# Patient Record
Sex: FEMALE | Race: BLACK OR AFRICAN AMERICAN | Employment: UNEMPLOYED | URBAN - METROPOLITAN AREA
[De-identification: names, ages, dates, MRNs, and addresses within clinical notes are randomized per-mention and may not be internally consistent; named-entity substitution may affect disease eponyms.]

---

## 2017-01-06 ENCOUNTER — OFFICE VISIT (OUTPATIENT)
Dept: FAMILY MEDICINE CLINIC | Facility: CLINIC | Age: 82
End: 2017-01-06

## 2017-01-06 VITALS
TEMPERATURE: 97.1 F | HEART RATE: 74 BPM | BODY MASS INDEX: 23.49 KG/M2 | HEIGHT: 65 IN | WEIGHT: 141 LBS | RESPIRATION RATE: 15 BRPM | SYSTOLIC BLOOD PRESSURE: 120 MMHG | OXYGEN SATURATION: 100 % | DIASTOLIC BLOOD PRESSURE: 64 MMHG

## 2017-01-06 DIAGNOSIS — H81.10 BPV (BENIGN POSITIONAL VERTIGO), UNSPECIFIED LATERALITY: ICD-10-CM

## 2017-01-06 DIAGNOSIS — N39.0 URINARY TRACT INFECTION, SITE UNSPECIFIED: Primary | ICD-10-CM

## 2017-01-06 DIAGNOSIS — S89.92XA KNEE INJURY, LEFT, INITIAL ENCOUNTER: ICD-10-CM

## 2017-01-06 DIAGNOSIS — R63.0 ANOREXIA: ICD-10-CM

## 2017-01-06 DIAGNOSIS — R26.89 BALANCE DISORDER: ICD-10-CM

## 2017-01-06 DIAGNOSIS — I63.9 CEREBELLAR STROKE (HCC): ICD-10-CM

## 2017-01-06 DIAGNOSIS — N39.0 URINARY TRACT INFECTION, SITE UNSPECIFIED: ICD-10-CM

## 2017-01-06 DIAGNOSIS — H91.92 HEARING PROBLEM, LEFT: ICD-10-CM

## 2017-01-06 RX ORDER — MECLIZINE HYDROCHLORIDE 25 MG/1
25 TABLET ORAL
Qty: 30 TAB | Refills: 1 | Status: SHIPPED | OUTPATIENT
Start: 2017-01-06 | End: 2017-01-16

## 2017-01-06 RX ORDER — NITROFURANTOIN 25; 75 MG/1; MG/1
100 CAPSULE ORAL 2 TIMES DAILY
Qty: 14 CAP | Refills: 0 | Status: SHIPPED | OUTPATIENT
Start: 2017-01-06 | End: 2017-02-03

## 2017-01-06 NOTE — PROGRESS NOTES
Saba Umaña is a 80 y.o.  female presents today for same day sick visit for balance problems. Pt is in Room # 2. This patient is accompanied in the office by her child and son. .        1. Have you been to the ER, urgent care clinic since your last visit? Hospitalized since your last visit? No    2. Have you seen or consulted any other health care providers outside of the [facility] since your last visit? Include any pap smears or colon screening.  No

## 2017-01-06 NOTE — PROGRESS NOTES
SUBJECTIVE    Chief Complaint   Patient presents with    Urinary Burning     HPI:   She comes in with her son. She says that she has burning on urination for the last several days. She has been getting dizzy with nausea now with even little movement of her head with occasional vomiting. Now her hearing in Lt ear is muffled. She feels dysbalance because she is not comfortable with her footing. There is no change. Her chronic back pain is unchanged. Now she has pain in her Lt knee. She says that she fell on that knee 2 days ago. Past Medical History   Diagnosis Date    Back pain     DJD (degenerative joint disease)      mostly of knees    DM (diabetes mellitus) (HCC)     Fatigue     GERD (gastroesophageal reflux disease)      w/chronic pulmonary fibrosis    Herpes zoster     Lumbar spondylosis      LBP    Motion sickness      chronic    PVCs (premature ventricular contractions)      recurrent    Varicose veins     Venous insufficiency      Lt LE     History reviewed. No pertinent past surgical history. Current Outpatient Prescriptions   Medication Sig    aspirin-acetaminophen-caffeine (EXCEDRIN ES) 250-250-65 mg per tablet Take 1 Tab by mouth.  Ca-D3-mag-zinc--kristina-boron (CALTRATE 600+D PLUS MINERALS) 600 mg calcium- 800 unit-40 mg chew Take 1 Tab by mouth two (2) times a day.  psyllium (FIBER) 0.52 gram capsule Take 1 Cap by mouth daily.  polyethylene glycol (MIRALAX) 17 gram/dose powder Take 17 g by mouth daily as needed.  ascorbic acid (VITAMIN C) 1,000 mg tablet Take 1,000 mg by mouth daily.  FERROUS FUMARATE/VIT BCOMP&C (SUPER B COMPLEX PO) Take 1 tablet by mouth daily.  fish oil-omega-3 fatty acids (FISH OIL) 300-1,000 mg cap Take 1 capsule by mouth daily.  coenzyme q10-vitamin e (COQ10 ) 100-100 mg-unit cap Take  by mouth. Two capsules in the morning and two capsules in the evening daily.     GLUCOSAMINE/CHONDROITN/NA/C/SE (JOINT FORMULA PO) Take by mouth. Two capsules in the morning and two capsules in the evening. No current facility-administered medications for this visit. Allergies: Iodine    ROS:   Constitutional: No fever, chills, night sweats, malaise. ENT: otherwise not complaints. Cardiovascular: No angina, palpitations, PND, orthopnea, lightheadedness, edema, claudication. Respiratory: No pleurisy, hemoptysis, unusual sputum. Gastrointestinal: no pain, melena, hematochezia. Psychiatric: No agitation, confusion/disorientation, suicidal or homicidal ideation. Musculoskeletal: chronic back problem and gait difficulty. Now Lt knee injury. OBJECTIVE  Constitutional: General Appearance:  well developed, well nourished, nontoxic, in no acute distress. Visit Vitals    /64 (BP 1 Location: Left arm, BP Patient Position: Sitting)    Pulse 74    Temp 97.1 °F (36.2 °C) (Oral)    Resp 15    Ht 5' 5\" (1.651 m)    Wt 141 lb (64 kg)    SpO2 100%    BMI 23.46 kg/m2     ENT[de-identified] Otoscopic Examination: ear canals are have mild cerumen; tympanic membranes are clear. Nose: clear. Throat:  clear. Pulmonary: Respiratory effort: normal; no dyspnea, no retractions, no accessory muscle use. Auscultation: normal & symmetrical air exchange; coarse rales, most prominent at Rt lower area; no rhonchi; no wheeze; no rubs  Cardiovascular: Auscultation: RRR; no murmur, rubs; S4 gallop unchanged. Extremities: no edema. GI[de-identified] Normal bowel sounds. No masses; no tenderness; no rebound/rigidity; no CVA tenderness. No hepatosplenomegaly. Bladder: no fullnesses, tenderness or palpable masses. Psychiatric: Oriented to time, place and person. Musculoskeletal: NC/AT. Neck-supple. Gait- walks with cane and now favoring Lt knee. Lt Knee: No heat, effusion, redness or swelling with tenderness on lateral patella. ROM with pain. No instability. Strength OK.      Lab Results   Component Value Date/Time    WBC 6.8 12/07/2016 02:52 PM    HGB 12.6 12/07/2016 02:52 PM    HCT 39.5 12/07/2016 02:52 PM    PLATELET 128 93/43/3702 02:52 PM    MCV 87.0 12/07/2016 02:52 PM     Lab Results   Component Value Date/Time    Sodium 137 12/07/2016 02:52 PM    Potassium 4.2 12/07/2016 02:52 PM    Chloride 101 12/07/2016 02:52 PM    CO2 27 12/07/2016 02:52 PM    Anion gap 9 12/07/2016 02:52 PM    Glucose 90 12/07/2016 02:52 PM    BUN 16 12/07/2016 02:52 PM    Creatinine 0.91 12/07/2016 02:52 PM    BUN/Creatinine ratio 18 12/07/2016 02:52 PM    GFR est AA >60 12/07/2016 02:52 PM    GFR est non-AA 58 12/07/2016 02:52 PM    Calcium 9.4 12/07/2016 02:52 PM    Bilirubin, total 0.6 12/07/2016 02:52 PM    ALT 12 12/07/2016 02:52 PM    AST 15 12/07/2016 02:52 PM    Alk. phosphatase 84 12/07/2016 02:52 PM    Protein, total 8.2 12/07/2016 02:52 PM    Albumin 3.9 12/07/2016 02:52 PM    Globulin 4.3 12/07/2016 02:52 PM    A-G Ratio 0.9 12/07/2016 02:52 PM     Lab Results   Component Value Date/Time    Hemoglobin A1c 6.1 07/21/2016 03:40 PM     Lab Results   Component Value Date/Time    TSH 4.67 12/07/2016 02:52 PM       ASSESSMENT    1. Urinary tract infection, site unspecified    2. BPV (benign positional vertigo), unspecified laterality    3. Knee injury, left, initial encounter    4. Hearing problem, left    5. Balance disorder    6. Cerebellar stroke ([facility] [address]    7. Anorexia        PLAN    Orders Placed This Encounter    CULTURE, URINE    XR KNEE LT MIN 4 V    URINALYSIS W/ RFLX MICROSCOPIC    REFERRAL TO HOME HEALTH    REFERRAL TO ENT-OTOLARYNGOLOGY    DISCONTD: aspirin-acetaminophen-caffeine (EXCEDRIN ES) 250-250-65 mg per tablet    nitrofurantoin, macrocrystal-monohydrate, (MACROBID) 100 mg capsule    meclizine (ANTIVERT) 25 mg tablet     Pharmacologic Management: Medications reviewed with the patient. Antivert 25 tid prn. Macrobid 100 bid after urine is obtained. Discussed DDx, follow-up & work-up. Avoid fall and injury. Cold compress, rest and elevate.   Health risk from non adherence discussed. Patient/ son voiced understanding. Follow up in as scheduled, prn sooner. Follow-up Disposition:  Return in about 1 month (around 2/6/2017).       Fernando Prescott MD

## 2017-01-06 NOTE — PATIENT INSTRUCTIONS
Preventing Falls: Care Instructions  Your Care Instructions  Getting around your home safely can be a challenge if you have injuries or health problems that make it easy for you to fall. Loose rugs and furniture in walkways are among the dangers for many older people who have problems walking or who have poor eyesight. People who have conditions such as arthritis, osteoporosis, or dementia also have to be careful not to fall. You can make your home safer with a few simple measures. Follow-up care is a key part of your treatment and safety. Be sure to make and go to all appointments, and call your doctor if you are having problems. It's also a good idea to know your test results and keep a list of the medicines you take. How can you care for yourself at home? Taking care of yourself  · You may get dizzy if you do not drink enough water. To prevent dehydration, drink plenty of fluids, enough so that your urine is light yellow or clear like water. Choose water and other caffeine-free clear liquids. If you have kidney, heart, or liver disease and have to limit fluids, talk with your doctor before you increase the amount of fluids you drink. · Exercise regularly to improve your strength, muscle tone, and balance. Walk if you can. Swimming may be a good choice if you cannot walk easily. · Have your vision and hearing checked each year or any time you notice a change. If you have trouble seeing and hearing, you might not be able to avoid objects and could lose your balance. · Know the side effects of the medicines you take. Ask your doctor or pharmacist whether the medicines you take can affect your balance. Sleeping pills or sedatives can affect your balance. · Limit the amount of alcohol you drink. Alcohol can impair your balance and other senses. · Ask your doctor whether calluses or corns on your feet need to be removed.  If you wear loose-fitting shoes because of calluses or corns, you can lose your balance and fall. · Talk to your doctor if you have numbness in your feet. Preventing falls at home  · Remove raised doorway thresholds, throw rugs, and clutter. Repair loose carpet or raised areas in the floor. · Move furniture and electrical cords to keep them out of walking paths. · Use nonskid floor wax, and wipe up spills right away, especially on ceramic tile floors. · If you use a walker or cane, put rubber tips on it. If you use crutches, clean the bottoms of them regularly with an abrasive pad, such as steel wool. · Keep your house well lit, especially Mammie Sitter, and outside walkways. Use night-lights in areas such as hallways and bathrooms. Add extra light switches or use remote switches (such as switches that go on or off when you clap your hands) to make it easier to turn lights on if you have to get up during the night. · Install sturdy handrails on stairways. · Move items in your cabinets so that the things you use a lot are on the lower shelves (about waist level). · Keep a cordless phone and a flashlight with new batteries by your bed. If possible, put a phone in each of the main rooms of your house, or carry a cell phone in case you fall and cannot reach a phone. Or, you can wear a device around your neck or wrist. You push a button that sends a signal for help. · Wear low-heeled shoes that fit well and give your feet good support. Use footwear with nonskid soles. Check the heels and soles of your shoes for wear. Repair or replace worn heels or soles. · Do not wear socks without shoes on wood floors. · Walk on the grass when the sidewalks are slippery. If you live in an area that gets snow and ice in the winter, sprinkle salt on slippery steps and sidewalks. Preventing falls in the bath  · Install grab bars and nonskid mats inside and outside your shower or tub and near the toilet and sinks. · Use shower chairs and bath benches.   · Use a hand-held shower head that will allow you to sit while showering. · Get into a tub or shower by putting the weaker leg in first. Get out of a tub or shower with your strong side first.  · Repair loose toilet seats and consider installing a raised toilet seat to make getting on and off the toilet easier. · Keep your bathroom door unlocked while you are in the shower. Where can you learn more? Go to http://kevin-danny.info/. Enter 0476 [id number] in the search box to learn more about \"Preventing Falls: Care Instructions. \"  Current as of: August 4, 2016  Content Version: 11.1  © 9621-5278 [employer]. Care instructions adapted under license by [employer] (which disclaims liability or warranty for this information). If you have questions about a medical condition or this instruction, always ask your healthcare professional. [place] [address] any warranty or liability for your use of this information. How to Get Up Safely After a Fall: Care Instructions  Your Care Instructions  If you have injuries, health problems, or other reasons that may make it easy for you to fall at home, it is a good idea to learn how to get up safely after a fall. Learning how to get up correctly can help you avoid making an injury worse. Also, knowing what to do if you cannot get up can help you stay safe until help arrives. Follow-up care is a key part of your treatment and safety. Be sure to make and go to all appointments, and call your doctor if you are having problems. It's also a good idea to know your test results and keep a list of the medicines you take. How can you care for yourself after a fall? If you think you can get up  First lie still for a few minutes and think about how you feel. If your body feels okay and you think you can get up safely, follow the rest of the steps below:  1. Look for a chair or other piece of furniture that is close to you.   2. Roll onto your side and rest. Roll by turning your head in the direction you want to roll, move your shoulder and arm, then hip and leg in the same direction. 3. Lie still for a moment to let your blood pressure adjust.  4. Slowly push your upper body up, lift your head, and take a moment to rest.  5. Slowly get up on your hands and knees, and crawl to the chair or other stable piece of furniture. 6. Put your hands on the chair. 7. Move one foot forward, and place it flat on the floor. Your other leg should be bent with the knee on the floor. 8. Rise slowly, turn your body, and sit in the chair. Stay seated for a bit and think about how you feel. Call for help. Even if you feel okay, let someone know what happened to you. You might not know that you have a serious injury. If you cannot get up  1. If you think you are injured after a fall or you cannot get up, try not to panic. 2. Call out for help. 3. If you have a phone within reach or you have an emergency call device, use it to call for help. 4. If you do not have a phone within reach, try to slide yourself toward it. If you cannot get to the phone, try to slide toward a door or window or a place where you think you can be heard. 5. [place] or use an object to make noise so someone might hear you. 6. If you can reach something that you can use for a pillow, place it under your head. Try to stay warm by covering yourself with a blanket or clothing while you wait for help. When should you call for help? Call 911 anytime you think you may need emergency care. For example, call if:  · You passed out (lost consciousness). · You cannot get up after a fall. · You believe you have serious or life-threatening injuries. Call your doctor now or seek immediate medical care if:  · You have severe pain. · You think you may have passed out but are not sure. · You hit your head or think you may have hit your head but are not sure. · You think your medicines may have caused you to fall.   Watch closely for changes in your health, and be sure to contact your doctor if:  · You have fallen, even if you think you are not hurt. Do not feel embarrassed to let your doctor know you have fallen. Your doctor may be able to adjust your medicines or provide other help so you can prevent future falls. Where can you learn more? Go to http://kevin-danny.info/. Enter Q629 in the search box to learn more about \"How to Get Up Safely After a Fall: Care Instructions. \"  Current as of: August 4, 2016  Content Version: 11.1  © 6659-6754 [employer]. Care instructions adapted under license by [employer] (which disclaims liability or warranty for this information). If you have questions about a medical condition or this instruction, always ask your healthcare professional. [address] any warranty or liability for your use of this information.

## 2017-01-10 ENCOUNTER — HOSPITAL ENCOUNTER (OUTPATIENT)
Dept: LAB | Age: 82
Discharge: HOME | End: 2017-01-10
Payer: MEDICARE

## 2017-01-10 LAB
APPEARANCE UR: ABNORMAL
BACTERIA URNS QL MICRO: ABNORMAL /HPF
BILIRUB UR QL: NEGATIVE
BILIRUB UR QL: NEGATIVE
CA OXALATE CRYSTALS,CAOXC: PRESENT
CAOX CRY URNS QL MICRO: ABNORMAL
COLOR UR: YELLOW
CULTURE RESULT, SENTARA: NORMAL
EPITH CASTS URNS QL MICRO: ABNORMAL /LPF (ref 0–5)
EPITHELIAL,EPSU: ABNORMAL /HPF (ref 0–2)
GLUCOSE UR QL: NEGATIVE MG/DL
GLUCOSE UR STRIP.AUTO-MCNC: NEGATIVE MG/DL
HGB UR QL STRIP: NEGATIVE
HGB UR QL STRIP: NEGATIVE
KETONES UR QL STRIP.AUTO: NEGATIVE MG/DL
KETONES UR QL STRIP.AUTO: NEGATIVE MG/DL
LEUKOCYTE ESTERASE UR QL STRIP.AUTO: ABNORMAL
LEUKOCYTE ESTERASE: ABNORMAL
NITRITE UR QL STRIP.AUTO: NEGATIVE
NITRITE UR QL STRIP.AUTO: POSITIVE
PH UR STRIP: 6 PH (ref 5–8)
PH UR STRIP: 6 [PH] (ref 5–8)
PROT UR QL STRIP: ABNORMAL MG/DL
PROT UR STRIP-MCNC: NEGATIVE MG/DL
RBC #/AREA URNS HPF: 0 /HPF (ref 0–5)
RBC #/AREA URNS HPF: ABNORMAL /HPF
SP GR UR REFRACTOMETRY: 1.02 (ref 1–1.03)
SP GR UR: 1.02 (ref 1–1.03)
UROBILINOGEN UR QL STRIP.AUTO: 0.2 EU/DL (ref 0.2–1)
UROBILINOGEN UR STRIP-MCNC: <2 MG/DL
WBC URNS QL MICRO: ABNORMAL /HPF (ref 0–2)
WBC URNS QL MICRO: ABNORMAL /HPF (ref 0–4)

## 2017-01-10 PROCEDURE — 81001 URINALYSIS AUTO W/SCOPE: CPT | Performed by: FAMILY MEDICINE

## 2017-01-10 PROCEDURE — 87186 SC STD MICRODIL/AGAR DIL: CPT | Performed by: FAMILY MEDICINE

## 2017-01-10 PROCEDURE — 87086 URINE CULTURE/COLONY COUNT: CPT | Performed by: FAMILY MEDICINE

## 2017-01-10 PROCEDURE — 36415 COLL VENOUS BLD VENIPUNCTURE: CPT | Performed by: FAMILY MEDICINE

## 2017-01-10 PROCEDURE — 87077 CULTURE AEROBIC IDENTIFY: CPT | Performed by: FAMILY MEDICINE

## 2017-01-11 ENCOUNTER — TELEPHONE (OUTPATIENT)
Dept: FAMILY MEDICINE CLINIC | Facility: CLINIC | Age: 82
End: 2017-01-11

## 2017-01-11 NOTE — PROGRESS NOTES
Urine culture was normal.  She has been referred to urology in May 2016 for similar symptoms; but we do not have a record of she going there. She should follow up with urology.

## 2017-01-11 NOTE — PROGRESS NOTES
Repeat urine culture from yesterday is positive. Sensitivity is pending she needs to finish the antibiotics.

## 2017-01-11 NOTE — LETTER
1/11/2017 4:24 PM 
 
Ms. Cedric Kwong [address] [place] [id number] Dear Ms. Saundersstacey Meseret, We have been unable to reach you by phone to notify you of your test results. Please complete your course of medication and follow up with this office on 02/03/17. Please call our office at [phone] and ask to speak with my nurse in order to explain these results to you and advise you of any recommendations.  
 
 
 
Sincerely, 
 
 
Chantell Bruner MD

## 2017-01-11 NOTE — TELEPHONE ENCOUNTER
Called pt and unable to leave message, phone is disconnected and EC is a wrong number. The call was to inform pt of lab results. Letter sent.

## 2017-01-13 LAB
BACTERIA SPEC CULT: NORMAL
BACTERIA SPEC CULT: NORMAL
SERVICE CMNT-IMP: NORMAL

## 2017-01-16 ENCOUNTER — TELEPHONE (OUTPATIENT)
Dept: FAMILY MEDICINE CLINIC | Facility: CLINIC | Age: 82
End: 2017-01-16

## 2017-01-16 ENCOUNTER — DOCUMENTATION ONLY (OUTPATIENT)
Dept: FAMILY MEDICINE CLINIC | Facility: CLINIC | Age: 82
End: 2017-01-16

## 2017-01-16 NOTE — PROGRESS NOTES
Son has been advised about patellar tendon in the Knee xray report and if not improving, will refer to orthopedist.

## 2017-01-16 NOTE — TELEPHONE ENCOUNTER
Spoke with pt's son in regards to lab and xray results. Relayed 's notes. Pt's son acknowledges understanding and states would like Home Health to be on hold until providing this office with a [address] of the family's choosing. In addition, the son states he will be taking the pt to the ED today he believes the pt is not drinking fluid and is constipated. Pt's son is advised to f/u with this office. Pt's son voices no concerns at this time. Notified Referral Coordinator, NN and PCP.

## 2017-01-19 DIAGNOSIS — S89.92XA KNEE INJURY, LEFT, INITIAL ENCOUNTER: ICD-10-CM

## 2017-02-03 ENCOUNTER — OFFICE VISIT (OUTPATIENT)
Dept: FAMILY MEDICINE CLINIC | Facility: CLINIC | Age: 82
End: 2017-02-03

## 2017-02-03 VITALS
RESPIRATION RATE: 17 BRPM | DIASTOLIC BLOOD PRESSURE: 60 MMHG | BODY MASS INDEX: 22.59 KG/M2 | HEART RATE: 74 BPM | SYSTOLIC BLOOD PRESSURE: 118 MMHG | WEIGHT: 135.6 LBS | OXYGEN SATURATION: 98 % | HEIGHT: 65 IN | TEMPERATURE: 97.3 F

## 2017-02-03 DIAGNOSIS — K59.00 CONSTIPATION, UNSPECIFIED CONSTIPATION TYPE: ICD-10-CM

## 2017-02-03 DIAGNOSIS — S89.92XD KNEE INJURY, LEFT, SUBSEQUENT ENCOUNTER: Primary | ICD-10-CM

## 2017-02-03 DIAGNOSIS — N30.00 ACUTE CYSTITIS WITHOUT HEMATURIA: ICD-10-CM

## 2017-02-03 NOTE — PROGRESS NOTES
1. Have you been to the ER, urgent care clinic since your last visit? Hospitalized since your last visit[place] [facility] [place]    2. Have you seen or consulted any other health care providers outside of the [address] [place] [address] since your last visit? Include any pap smears or colon screening. No    Patient denies pain at this time, however sons report that she has been complaining of shoulder pain at night. Patient's son also reports that patient has an appointment with audiology on the 9th of February.

## 2017-02-03 NOTE — PROGRESS NOTES
SUBJECTIVE    Chief Complaint   Patient presents with   [facility] Follow Up     UTI and Constipation    Knee Injury     Lt, f/u     HPI:   She comes in with her two sons for follow up of UTI, Constipation and Lt knee injury. She went to the ER on 1/16/17 for constipation and at that time she was given antibiotics for UTI also. Since then her constipation is better. However, she may be having some burning on urination for the last few days per her sons, which she denies. She is not drinking much fluids. Sons estimate much less than a liter/ day. Her knee pain is better. Denies any fall since few weeks ago. Her chronic back pain is unchanged. Past Medical History   Diagnosis Date    Back pain     DJD (degenerative joint disease)      mostly of knees    DM (diabetes mellitus) (HCC)     Fatigue     GERD (gastroesophageal reflux disease)      w/chronic pulmonary fibrosis    Herpes zoster     Lumbar spondylosis      LBP    Motion sickness      chronic    PVCs (premature ventricular contractions)      recurrent    Varicose veins     Venous insufficiency      Lt LE     History reviewed. No pertinent past surgical history. Current Outpatient Prescriptions   Medication Sig    Ca-D3-mag-zinc--kristina-boron (CALTRATE 600+D PLUS MINERALS) 600 mg calcium- 800 unit-40 mg chew Take 1 Tab by mouth two (2) times a day.  psyllium (FIBER) 0.52 gram capsule Take 1 Cap by mouth daily.  polyethylene glycol (MIRALAX) 17 gram/dose powder Take 17 g by mouth daily as needed.  ascorbic acid (VITAMIN C) 1,000 mg tablet Take 1,000 mg by mouth daily.  FERROUS FUMARATE/VIT BCOMP&C (SUPER B COMPLEX PO) Take 1 tablet by mouth daily.  fish oil-omega-3 fatty acids (FISH OIL) 300-1,000 mg cap Take 1 capsule by mouth daily.  coenzyme q10-vitamin e (COQ10 ) 100-100 mg-unit cap Take  by mouth. Two capsules in the morning and two capsules in the evening daily.     GLUCOSAMINE/CHONDROITN/NA/C/SE (JOINT FORMULA PO) Take  by mouth. Two capsules in the morning and two capsules in the evening. No current facility-administered medications for this visit. Allergies: Iodine    ROS:   Constitutional: No fever, chills, night sweats, malaise. Cardiovascular: No angina, palpitations, PND, orthopnea, lightheadedness, edema, claudication. Respiratory: No pleurisy, hemoptysis, unusual sputum. Gastrointestinal: no pain, melena, hematochezia. Psychiatric: No agitation, confusion/disorientation, suicidal or homicidal ideation. Musculoskeletal: chronic back problem and gait difficulty. Lt knee is better. OBJECTIVE  Constitutional: General Appearance:  well developed, well nourished, nontoxic, in no acute distress. Visit Vitals    /60 (BP 1 Location: Left arm, BP Patient Position: Sitting)    Pulse 74    Temp 97.3 °F (36.3 °C) (Oral)    Resp 17    Ht 5' 5\" (1.651 m)    Wt 135 lb 9.6 oz (61.5 kg)    SpO2 98%    BMI 22.57 kg/m2       Pulmonary: Respiratory effort: normal; no dyspnea, no retractions, no accessory muscle use. Auscultation: normal & symmetrical air exchange; coarse rales, most prominent at Rt lower area; no rhonchi; no wheeze; no rubs  Cardiovascular: Auscultation: RRR; no murmur, rubs; S4 gallop unchanged. Extremities: no edema. GI[de-identified] Normal bowel sounds. No masses; no tenderness; no rebound/rigidity; no CVA tenderness. No hepatosplenomegaly. Bladder: no fullnesses, tenderness or palpable masses. Psychiatric: Oriented to time, place and person. Musculoskeletal: NC/AT. Neck-supple. Gait- walks with cane. Lt Knee: No heat, effusion, redness or swelling No more tenderness on lateral patella. Good ROM without pain. No instability. Strength OK.      Lab Results   Component Value Date/Time    WBC 6.8 12/07/2016 02:52 PM    HGB 12.6 12/07/2016 02:52 PM    HCT 39.5 12/07/2016 02:52 PM    PLATELET 825 62/79/0152 02:52 PM    MCV 87.0 12/07/2016 02:52 PM     Lab Results   Component Value Date/Time    Sodium 137 12/07/2016 02:52 PM    Potassium 4.2 12/07/2016 02:52 PM    Chloride 101 12/07/2016 02:52 PM    CO2 27 12/07/2016 02:52 PM    Anion gap 9 12/07/2016 02:52 PM    Glucose 90 12/07/2016 02:52 PM    BUN 16 12/07/2016 02:52 PM    Creatinine 0.91 12/07/2016 02:52 PM    BUN/Creatinine ratio 18 12/07/2016 02:52 PM    GFR est AA >60 12/07/2016 02:52 PM    GFR est non-AA 58 12/07/2016 02:52 PM    Calcium 9.4 12/07/2016 02:52 PM    Bilirubin, total 0.6 12/07/2016 02:52 PM    AST (SGOT) 15 12/07/2016 02:52 PM    Alk. phosphatase 84 12/07/2016 02:52 PM    Protein, total 8.2 12/07/2016 02:52 PM    Albumin 3.9 12/07/2016 02:52 PM    Globulin 4.3 12/07/2016 02:52 PM    A-G Ratio 0.9 12/07/2016 02:52 PM    ALT (SGPT) 12 12/07/2016 02:52 PM     Lab Results   Component Value Date/Time    Hemoglobin A1c 6.1 07/21/2016 03:40 PM     Lab Results   Component Value Date/Time    TSH 4.67 12/07/2016 02:52 PM       ASSESSMENT    1. Knee injury, left, subsequent encounter    2. Constipation, unspecified constipation type    3. Acute cystitis without hematuria        PLAN    Orders Placed This Encounter    URINALYSIS W/ RFLX MICROSCOPIC     Pharmacologic Management: Medications reviewed with the patient. Will start on Cipro if U/A shows infection after urine is obtained. Discussed DDx, follow-up & work-up. Avoid fall and injury. Increase fluid intake. Health risk from non adherence discussed. Patient/ sons voiced understanding. Follow up in as scheduled, prn sooner. Follow-up Disposition:  Return in about 1 month (around 3/3/2017).       Jose Sarah MD

## 2017-02-09 ENCOUNTER — HOSPITAL ENCOUNTER (OUTPATIENT)
Dept: LAB | Age: 82
Discharge: HOME | End: 2017-02-09

## 2017-02-09 DIAGNOSIS — N30.00 ACUTE CYSTITIS WITHOUT HEMATURIA: ICD-10-CM

## 2017-02-09 PROCEDURE — 99001 SPECIMEN HANDLING PT-LAB: CPT | Performed by: FAMILY MEDICINE

## 2017-02-10 ENCOUNTER — PATIENT OUTREACH (OUTPATIENT)
Dept: FAMILY MEDICINE CLINIC | Facility: CLINIC | Age: 82
End: 2017-02-10

## 2017-02-10 LAB
APPEARANCE UR: ABNORMAL
BACTERIA #/AREA URNS HPF: ABNORMAL /[HPF]
BILIRUB UR QL STRIP: NEGATIVE
CASTS URNS QL MICRO: ABNORMAL /LPF
COLOR UR: YELLOW
EPI CELLS #/AREA URNS HPF: ABNORMAL /HPF
GLUCOSE UR QL: NEGATIVE
HGB UR QL STRIP: NEGATIVE
KETONES UR QL STRIP: NEGATIVE
LEUKOCYTE ESTERASE UR QL STRIP: ABNORMAL
MICRO URNS: ABNORMAL
MUCOUS THREADS URNS QL MICRO: PRESENT
NITRITE UR QL STRIP: NEGATIVE
PH UR STRIP: 6.5 [PH] (ref 5–7.5)
PROT UR QL STRIP: NEGATIVE
RBC #/AREA URNS HPF: ABNORMAL /HPF
SP GR UR: 1.02 (ref 1–1.03)
UROBILINOGEN UR STRIP-MCNC: 0.2 MG/DL (ref 0.2–1)
WBC #/AREA URNS HPF: ABNORMAL /HPF

## 2017-02-10 NOTE — PROGRESS NOTES
Called Patient to relay Dr. Helen Colvin message regarding urine lab result. Patient and patient's son verbalized understanding. Patient to come in on Monday for urine culture. No concern and question at this time as per Pt. And  patient's son.

## 2017-02-13 ENCOUNTER — TELEPHONE (OUTPATIENT)
Dept: FAMILY MEDICINE CLINIC | Facility: CLINIC | Age: 82
End: 2017-02-13

## 2017-03-06 ENCOUNTER — TELEPHONE (OUTPATIENT)
Dept: FAMILY MEDICINE CLINIC | Facility: CLINIC | Age: 82
End: 2017-03-06

## 2017-03-06 NOTE — TELEPHONE ENCOUNTER
Received a call from pt's son and reported pt has fallen and hit her head. He believes she has a hematoma on her head. Pt's son is advised to Call 911. Pt's son states he will take the pt's to [facility] ED now. Will fax demographics to [facility].

## 2017-03-07 ENCOUNTER — PATIENT OUTREACH (OUTPATIENT)
Dept: FAMILY MEDICINE CLINIC | Facility: CLINIC | Age: 82
End: 2017-03-07

## 2017-03-07 ENCOUNTER — TELEPHONE (OUTPATIENT)
Dept: FAMILY MEDICINE CLINIC | Facility: CLINIC | Age: 82
End: 2017-03-07

## 2017-03-07 NOTE — PROGRESS NOTES
Patient admitted to [facility] -ED on 3/6/17-3/6/17 for Contusion of Occipital,  Post Fall. NN contact on 3/7/17      Reached patient's son Tunde Adler on phone and verified patient's identity using name and . Introduced self/role and purpose of call. Patient's son kept the conversation short. Patient's son  stated Emilee Lopez is doing fine. The CT scan is negative. The doctor said that the swelling on her head is normal and  will decrease soon\" Patient's son denied patient's having  Any chest pain, shortness of breath, fever and chills. Patient's son denied confusion and change in LOC. Patient's son states that patient is acting normal.     Patien's son verbalized understanding of discharge plan and special follow up with Dr. Anibal Pittman. Appointments:  3/14/17 at 2:30 Pm Dr. Anibal Pittman. Patient's son refused sooner appointment. Patient's son aware of appointments. Patient's son will provide transportation. Barriers to care  No barriers to care identified at this time. Adherence to previous treatment and likelihood for follow-up:  Patient's son verbalized understanding of discharge instructions and special follow up. Educated patient's son to monitor and report the following Red flags: Chest pain, shortness of breath, acting different, confusion,  change in LOC, worsening of symptoms or any new or concerning symptoms. Advised patient's son to seek medical attention with patient provider, urgent care or return to the emergency department if any of the following symptoms  occur after being discharged from the hospital:  fever >101.5F,  chest pain, shortness of breath, leg swelling/pain, and/or return of the symptoms which initially resulted in hospitalization. Patient's son verbalized understanding of information discussed and is aware of  when to seek medical attention from PCP, urgent care or ED. Opportunity to ask questions was provided.  Contact information was provided for future reference, assistance, concerns, or further questions. No assistance, needs, concerns and questions at this as per patient's son.

## 2017-03-14 ENCOUNTER — OFFICE VISIT (OUTPATIENT)
Dept: FAMILY MEDICINE CLINIC | Facility: CLINIC | Age: 82
End: 2017-03-14

## 2017-03-14 ENCOUNTER — PATIENT OUTREACH (OUTPATIENT)
Dept: FAMILY MEDICINE CLINIC | Facility: CLINIC | Age: 82
End: 2017-03-14

## 2017-03-14 VITALS
WEIGHT: 132 LBS | SYSTOLIC BLOOD PRESSURE: 120 MMHG | OXYGEN SATURATION: 97 % | DIASTOLIC BLOOD PRESSURE: 70 MMHG | HEART RATE: 88 BPM | BODY MASS INDEX: 21.99 KG/M2 | RESPIRATION RATE: 15 BRPM | HEIGHT: 65 IN | TEMPERATURE: 97.6 F

## 2017-03-14 DIAGNOSIS — S00.03XA HEMATOMA OF SCALP, INITIAL ENCOUNTER: ICD-10-CM

## 2017-03-14 DIAGNOSIS — R79.89 ELEVATED TSH: ICD-10-CM

## 2017-03-14 DIAGNOSIS — N30.00 ACUTE CYSTITIS WITHOUT HEMATURIA: ICD-10-CM

## 2017-03-14 DIAGNOSIS — R26.89 BALANCE DISORDER: ICD-10-CM

## 2017-03-14 DIAGNOSIS — E11.9 TYPE 2 DIABETES MELLITUS WITHOUT COMPLICATION, WITHOUT LONG-TERM CURRENT USE OF INSULIN (HCC): ICD-10-CM

## 2017-03-14 DIAGNOSIS — S09.90XA CLOSED HEAD INJURY, INITIAL ENCOUNTER: Primary | ICD-10-CM

## 2017-03-14 DIAGNOSIS — Z00.00 MEDICARE ANNUAL WELLNESS VISIT, SUBSEQUENT: ICD-10-CM

## 2017-03-14 NOTE — PATIENT INSTRUCTIONS
Schedule of Personalized Health Plan  (Provide Copy to Patient)  The best way to stay healthy is to live a healthy lifestyle. A healthy lifestyle includes regular exercise, eating a well-balanced diet, keeping a healthy weight and not smoking. Regular physical exams and screening tests are another important way to take care of yourself. Preventive exams provided by health care providers can find health problems early when treatment works best and can keep you from getting certain diseases or illnesses. Preventive services include exams, lab tests, screenings, shots, monitoring and information to help you take care of your own health. All people over 65 should have a pneumonia shot. Pneumonia shots are usually only needed once in a lifetime unless your doctor decides differently. All people over 65 should have a yearly flu shot. People over 65 are at medium to high risk for Hepatitis B. Three shots are needed for complete protection. In addition to your physical exam, some screening tests are recommended:    Bone mass measurement (dexa scan) is recommended every two years  Diabetes Mellitus screening is recommended every year. Glaucoma is an eye disease caused by high pressure in the eye. An eye exam is recommended every year. Cardiovascular screening tests that check your cholesterol and other blood fat (lipid) levels are recommended every five years. Colorectal Cancer screening tests help to find pre-cancerous polyps (growths in the colon) so they can be removed before they turn into cancer. Tests ordered for screening depend on your personal and family history risk factors.     Screening for Breast Cancer is recommended yearly with a mammogram.    Screening for Cervical Cancer is recommended every two years (annually for certain risk factors, such as previous history of STD or abnormal PAP in past 7 years), with a Pelvic Exam with PAP    Here is a list of your current Health Maintenance items with a due date:  Health Maintenance   Topic Date Due    FOOT EXAM Q1  01/11/1935    EYE EXAM RETINAL OR DILATED Q1  [birth year]/1935    GLAUCOMA SCREENING Q2Y  01/11/1990    MICROALBUMIN Q1  09/11/2015    HEMOGLOBIN A1C Q6M  01/21/2017    LIPID PANEL Q1  02/28/2017    Pneumococcal 65+ Low/Medium Risk (2 of 2 - PPSV23) 03/14/2018    MEDICARE YEARLY EXAM  03/15/2018    DTaP/Tdap/Td series (2 - Td) 03/14/2027    OSTEOPOROSIS SCREENING (DEXA)  Addressed    ZOSTER VACCINE AGE 60>  Addressed    INFLUENZA AGE 9 TO ADULT  Addressed          Preventing Falls: Care Instructions  Your Care Instructions  Getting around your home safely can be a challenge if you have injuries or health problems that make it easy for you to fall. Loose rugs and furniture in walkways are among the dangers for many older people who have problems walking or who have poor eyesight. People who have conditions such as arthritis, osteoporosis, or dementia also have to be careful not to fall. You can make your home safer with a few simple measures. Follow-up care is a key part of your treatment and safety. Be sure to make and go to all appointments, and call your doctor if you are having problems. It's also a good idea to know your test results and keep a list of the medicines you take. How can you care for yourself at home? Taking care of yourself  · You may get dizzy if you do not drink enough water. To prevent dehydration, drink plenty of fluids, enough so that your urine is light yellow or clear like water. Choose water and other caffeine-free clear liquids. If you have kidney, heart, or liver disease and have to limit fluids, talk with your doctor before you increase the amount of fluids you drink. · Exercise regularly to improve your strength, muscle tone, and balance. Walk if you can. Swimming may be a good choice if you cannot walk easily. · Have your vision and hearing checked each year or any time you notice a change.  If you have trouble seeing and hearing, you might not be able to avoid objects and could lose your balance. · Know the side effects of the medicines you take. Ask your doctor or pharmacist whether the medicines you take can affect your balance. Sleeping pills or sedatives can affect your balance. · Limit the amount of alcohol you drink. Alcohol can impair your balance and other senses. · Ask your doctor whether calluses or corns on your feet need to be removed. If you wear loose-fitting shoes because of calluses or corns, you can lose your balance and fall. · Talk to your doctor if you have numbness in your feet. Preventing falls at home  · Remove raised doorway thresholds, throw rugs, and clutter. Repair loose carpet or raised areas in the floor. · Move furniture and electrical cords to keep them out of walking paths. · Use nonskid floor wax, and wipe up spills right away, especially on ceramic tile floors. · If you use a walker or cane, put rubber tips on it. If you use crutches, clean the bottoms of them regularly with an abrasive pad, such as steel wool. · Keep your house well lit, especially [place], and outside walkways. Use night-lights in areas such as hallways and bathrooms. Add extra light switches or use remote switches (such as switches that go on or off when you clap your hands) to make it easier to turn lights on if you have to get up during the night. · Install sturdy handrails on stairways. · Move items in your cabinets so that the things you use a lot are on the lower shelves (about waist level). · Keep a cordless phone and a flashlight with new batteries by your bed. If possible, put a phone in each of the main rooms of your house, or carry a cell phone in case you fall and cannot reach a phone. Or, you can wear a device around your neck or wrist. You push a button that sends a signal for help. · Wear low-heeled shoes that fit well and give your feet good support.  Use footwear with nonskid soles. Check the heels and soles of your shoes for wear. Repair or replace worn heels or soles. · Do not wear socks without shoes on wood floors. · Walk on the grass when the sidewalks are slippery. If you live in an area that gets snow and ice in the winter, sprinkle salt on slippery steps and sidewalks. Preventing falls in the bath  · Install grab bars and nonskid mats inside and outside your shower or tub and near the toilet and sinks. · Use shower chairs and bath benches. · Use a hand-held shower head that will allow you to sit while showering. · Get into a tub or shower by putting the weaker leg in first. Get out of a tub or shower with your strong side first.  · Repair loose toilet seats and consider installing a raised toilet seat to make getting on and off the toilet easier. · Keep your bathroom door unlocked while you are in the shower. Where can you learn more? Go to http://kevin-danny.info/. Enter [id number] [id number] in the search box to learn more about \"Preventing Falls: Care Instructions. \"  Current as of: August 4, 2016  Content Version: 11.1  © 7942-2595 [employer]. Care instructions adapted under license by [employer] (which disclaims liability or warranty for this information). If you have questions about a medical condition or this instruction, always ask your healthcare professional. [place] [address] any warranty or liability for your use of this information. Preventing Outdoor Falls: Care Instructions  Your Care Instructions  Worries about falls don't need to keep you indoors. Outdoor activities like walking have big benefits for your health. You will need to watch your step and learn a few safety measures. If you are worried about having a fall outdoors, ask your doctor about exercises, classes, or physical therapy that may help. You can learn ways to gain strength, flexibility, and balance.  Ask if it might help to use a cane or walker. You can make your time outdoors safer with a few simple measures. Follow-up care is a key part of your treatment and safety. Be sure to make and go to all appointments, and call your doctor if you are having problems. It's also a good idea to know your test results and keep a list of the medicines you take. How can you prevent falls outdoors? · Wear shoes with firm soles and low heels. If you have to walk on an icy surface, use grippers that can be worn over your shoes in bad weather. · Be extra careful if weather is bad. Walk on the grass when the sidewalks are slick. If you live in a place that gets snow and ice in the winter, sprinkle salt on slippery stairs and sidewalks. · Be careful getting on or off buses and trains or getting in and out of cars. If handrails are available, use them. · Be careful when you cross the street. Look for crosswalks or places where curb cuts or ramps are present. · Try not to hurry, especially if you are carrying something. · Be cautious in parking lots or garages. There may be curbs or changes in pavement, or the height of the pavement may vary. · Make sure to wear the correct eyeglasses, if you need them. Reading glasses or bifocals can make it harder to see hazards that might be in your way. · If you are walking outdoors for exercise, try to:  ¨ Walk in well-lighted, well-maintained areas. These include high school or college tracks, shopping malls, and public spaces. ¨ Walk with a partner. ¨ Watch out for cracked sidewalks, curbs, changes in the height of the pavement, exposed tree roots, and debris such as fallen leaves or branches. Where can you learn more? Go to http://kevin-danny.info/. Enter O690 in the search box to learn more about \"Preventing Outdoor Falls: Care Instructions. \"  Current as of: August 4, 2016  Content Version: 11.1  © 2447-7016 [employer].  Care instructions adapted under license by [employer] (which disclaims liability or warranty for this information). If you have questions about a medical condition or this instruction, always ask your healthcare professional. [address] any warranty or liability for your use of this information. How to Get Up Safely After a Fall: Care Instructions  Your Care Instructions  If you have injuries, health problems, or other reasons that may make it easy for you to fall at home, it is a good idea to learn how to get up safely after a fall. Learning how to get up correctly can help you avoid making an injury worse. Also, knowing what to do if you cannot get up can help you stay safe until help arrives. Follow-up care is a key part of your treatment and safety. Be sure to make and go to all appointments, and call your doctor if you are having problems. It's also a good idea to know your test results and keep a list of the medicines you take. How can you care for yourself after a fall? If you think you can get up  First lie still for a few minutes and think about how you feel. If your body feels okay and you think you can get up safely, follow the rest of the steps below:  1. Look for a chair or other piece of furniture that is close to you. 2. Roll onto your side and rest. Roll by turning your head in the direction you want to roll, move your shoulder and arm, then hip and leg in the same direction. 3. Lie still for a moment to let your blood pressure adjust.  4. Slowly push your upper body up, lift your head, and take a moment to rest.  5. Slowly get up on your hands and knees, and crawl to the chair or other stable piece of furniture. 6. Put your hands on the chair. 7. Move one foot forward, and place it flat on the floor. Your other leg should be bent with the knee on the floor. 8. Rise slowly, turn your body, and sit in the chair. Stay seated for a bit and think about how you feel. Call for help.  Even if you feel okay, let someone know what happened to you. You might not know that you have a serious injury. If you cannot get up  1. If you think you are injured after a fall or you cannot get up, try not to panic. 2. Call out for help. 3. If you have a phone within reach or you have an emergency call device, use it to call for help. 4. If you do not have a phone within reach, try to slide yourself toward it. If you cannot get to the phone, try to slide toward a door or window or a place where you think you can be heard. 5. [place] or use an object to make noise so someone might hear you. 6. If you can reach something that you can use for a pillow, place it under your head. Try to stay warm by covering yourself with a blanket or clothing while you wait for help. When should you call for help? Call 911 anytime you think you may need emergency care. For example, call if:  · You passed out (lost consciousness). · You cannot get up after a fall. · You believe you have serious or life-threatening injuries. Call your doctor now or seek immediate medical care if:  · You have severe pain. · You think you may have passed out but are not sure. · You hit your head or think you may have hit your head but are not sure. · You think your medicines may have caused you to fall. Watch closely for changes in your health, and be sure to contact your doctor if:  · You have fallen, even if you think you are not hurt. Do not feel embarrassed to let your doctor know you have fallen. Your doctor may be able to adjust your medicines or provide other help so you can prevent future falls. Where can you learn more? Go to http://kevin-danny.info/. Enter V209 in the search box to learn more about \"How to Get Up Safely After a Fall: Care Instructions. \"  Current as of: August 4, 2016  Content Version: 11.1  © 4873-5770 [employer].  Care instructions adapted under license by [employer] (which disclaims liability or warranty for this information). If you have questions about a medical condition or this instruction, always ask your healthcare professional. [address] any warranty or liability for your use of this information. Advance Care Planning: Care Instructions  Your Care Instructions  It can be hard to live with an illness that cannot be cured. But if your health is getting worse, you may want to make decisions about end-of-life care. Planning for the end of your life does not mean that you are giving up. It is a way to make sure that your wishes are met. Clearly stating your wishes can make it easier for your loved ones. Making plans while you are still able may also ease your mind and make your final days less stressful and more meaningful. Follow-up care is a key part of your treatment and safety. Be sure to make and go to all appointments, and call your doctor if you are having problems. It's also a good idea to know your test results and keep a list of the medicines you take. What can you do to plan for the end of life? · You can bring these issues up with your doctor. You do not need to wait until your doctor starts the conversation. You might start with \"I would not be willing to live with . Tellezedwige Montesinoschoedwige Montesinoschoedwige Santamaria \" When you complete this sentence it helps your doctor understand your wishes. · Talk openly and honestly with your doctor. This is the best way to understand the decisions you will need to make as your health changes. Know that you can always change your mind. · Ask your doctor about commonly used life-support measures. These include tube feedings, breathing machines, and fluids given through a vein (IV). Understanding these treatments will help you decide whether you want them. · You may choose to have these life-supporting treatments for a limited time. This allows a trial period to see whether they will help you.  You may also decide that you want your doctor to take only certain measures to keep you alive. It is important to spell out these conditions so that your doctor and family understand them. · Talk to your doctor about how long you are likely to live. He or she may be able to give you an idea of what usually happens with your specific illness. · Think about preparing papers that state your wishes. This way there will not be any confusion about what you want. You can change your instructions at any time. Which papers should you prepare? Advance directives are legal papers that tell doctors how you want to be cared for at the end of your life. You do not need a  to write these papers. Ask your doctor or your state [facility] department for information on how to write your advance directives. They may have the forms for each of these types of papers. Make sure your doctor has a copy of these on file, and give a copy to a family member or close friend. · Consider a do-not-resuscitate order (DNR). This order asks that no extra treatments be done if your heart stops or you stop breathing. Extra treatments may include electrical shock to restart your heart or a machine to breathe for you. If you decide to have a DNR order, ask your doctor to explain and write it. Place the order in your home where everyone can easily see it. · Consider a living will. A living will explains your wishes in case you are in a coma or cannot communicate. Living carlin tell doctors to use or not use treatments that would keep you alive. You must have one or two witnesses or a notary present when you sign this form. · Consider a durable power of . This allows you to name a person to make decisions about your care if you are not able to. Most people ask a close friend or family member. Talk to this person about the kinds of treatments you want and those that you do not want. Make sure this person understands your wishes.  If this person is not the health care agent named in your advance directive, also talk with your health care agent. These legal papers are simple to change. Tell your doctor what you want to change, and ask him or her to make a note in your medical file. Give your family updated copies of the papers. Where can you learn more? Go to http://kevin-danny.info/. Enter P184 in the search box to learn more about \"Advance Care Planning: Care Instructions. \"  Current as of: February 24, 2016  Content Version: 11.1  © 2653-9115 [employer]. Care instructions adapted under license by [employer] (which disclaims liability or warranty for this information). If you have questions about a medical condition or this instruction, always ask your healthcare professional. [address] any warranty or liability for your use of this information. Deciding About Life-Prolonging Treatment  What is life-prolonging treatment? There are many kinds of treatment that can help you live longer. These may be needed for only a short time until your illness improves. Or you may use them over the long term to help keep you alive. Some treatments include the use of:  · Medicines to slow the progress of certain diseases, such as heart disease, diabetes, cancer, AIDS, or Alzheimer's disease. · Antibiotics to treat serious infections, such as pneumonia. · Dialysis to clean your blood if your kidneys stop working. · A breathing machine to help you breathe if you can't breathe on your own. This machine pumps air into your lungs through a tube put into your throat. · A feeding tube or an intravenous (IV) line to give you food and fluids if you can't eat or drink. · Cardiopulmonary resuscitation (CPR) to try to restart your heart. The decision to receive treatments that may help you live longer is a personal one. You may want your doctor to do everything possible to keep you alive, even when your chance for recovery is small.  Or you may choose to only have care to manage your pain and other symptoms. What are key points about this decision? · If there is a good chance that your illness can be cured or managed, your doctor may advise you to first try available treatments. If these don't work, then you might think about stopping treatment. · If you stop treatment, you will still receive care that focuses on pain relief and comfort. · A decision to stop treatment that keeps you alive does not have to be permanent. You can always change your mind if your health starts to improve. · Even though treatment focuses on helping you live longer, it may cause side effects that can greatly affect your quality of life. And it could affect how you spend time with your family and friends. · If you still have personal goals that you want to pursue, you may want treatment that keeps you alive long enough to reach them. Why might you choose life-prolonging treatment? · There is a good chance that your illness can be cured or managed. · You think you can manage the possible side effects of treatment. · You don't think treatment will get in the way of your quality of life. · You have personal goals that you still want to pursue and achieve. Why might you choose to stop life-prolonging treatment? · Your chance of surviving your illness is very low. · You have tried all possible treatments for your illness, but they have not helped. · You can no longer deal with the side effects of treatment. · You have already met the goals you set out to achieve in your life. Your decision  Thinking about the facts and your feelings can help you make a decision that is right for you. Be sure you understand the benefits and risks of your options, and think about what else you need to do before you make the decision. Where can you learn more? Go to http://kevin-danny.info/.   Enter U381 in the search box to learn more about \"Deciding About Life-Prolonging Treatment. \"  Current as of: February 24, 2016  Content Version: 11.1  © 7795-2765 [employer]. Care instructions adapted under license by [employer] (which disclaims liability or warranty for this information). If you have questions about a medical condition or this instruction, always ask your healthcare professional. [address] any warranty or liability for your use of this information. Learning About Living [place]roy  What is a living will? A living will is a legal form you use to write down the kind of care you want at the end of your life. It is used by the health professionals who will treat you if you aren't able to decide for yourself. If you put your wishes in writing, your loved ones and others will know what kind of care you want. They won't need to guess. This can ease your mind and be helpful to others. A living will is not the same as an estate or property will. An estate will explains what you want to happen with your money and property after you die. Is a living will a legal document? A living will is a legal document. Each state has its own laws about living carlin. If you move to another state, make sure that your living will is legal in the state where you now live. Or you might use a universal form that has been approved by many states. This kind of form can sometimes be completed and stored online. Your electronic copy will then be available wherever you have a connection to the Internet. In most cases, doctors will respect your wishes even if you have a form from a different state. · You don't need an  to complete a living will. But legal advice can be helpful if your state's laws are unclear, your health history is complicated, or your family can't agree on what should be in your living will. · You can change your living will at any time.  Some people find that their wishes about end-of-life care change as their health changes. · In addition to making a living will, think about completing a medical power of  form. This form lets you name the person you want to make end-of-life treatment decisions for you (your \"health care agent\") if you're not able to. Many hospitals and nursing homes will give you the forms you need to complete a living will and a medical power of . · Your living will is used only if you can't make or communicate decisions for yourself anymore. If you become able to make decisions again, you can accept or refuse any treatment, no matter what you wrote in your living will. · Your state may offer an online registry. This is a place where you can store your living will online so the doctors and nurses who need to treat you can find it right away. What should you think about when creating a living will? Talk about your end-of-life wishes with your family members and your doctor. Let them know what you want. That way the people making decisions for you won't be surprised by your choices. Think about these questions as you make your living will:  · Do you know enough about life support methods that might be used? If not, talk to your doctor so you know what might be done if you can't breathe on your own, your heart stops, or you're unable to swallow. · What things would you still want to be able to do after you receive life-support methods? Would you want to be able to walk? To speak? To eat on your own? To live without the help of machines? · If you have a choice, where do you want to be cared for? In your home? At a hospital or nursing home? · Do you want certain Mormon practices performed if you become very ill? · If you have a choice at the end of your life, where would you prefer to die? At home? In a hospital or nursing home? Somewhere else? · Would you prefer to be buried or cremated? · Do you want your organs to be donated after you die?   What should you do with your living will? · Make sure that your family members and your health care agent have copies of your living will. · Give your doctor a copy of your living will to keep in your medical record. If you have more than one doctor, make sure that each one has a copy. · You may want to put a copy of your living will where it can be easily found. Where can you learn more? Go to http://kevin-danny.info/. Enter H171 in the search box to learn more about \"Learning About Living [place]. \"  Current as of: February 24, 2016  Content Version: 11.1  © 8649-2706 [employer]. Care instructions adapted under license by [employer] (which disclaims liability or warranty for this information). If you have questions about a medical condition or this instruction, always ask your healthcare professional. [address] any warranty or liability for your use of this information. Learning About Medical Power of   What is a medical power of ? A medical power of  is one type of the legal forms called advance directives. It lets you decide who you want to make treatment decisions for you if you cannot speak or decide for yourself. The person you choose is called your health care agent. Another type of advance directive is a living will. It lets you write down what kinds of treatment or life support you want or do not want. What should you think about when choosing a health care agent? Choose your health care agent carefully. This person may or may not be a family member. Talk to the person before you make your final decision. Make sure he or she is comfortable with this responsibility. It's a good idea to choose someone who:  · Is at least 25years old. · Knows you well and understands what makes life meaningful for you. · Understands your Roman Catholic and moral values. · Will do what you want, not what he or she wants.   · Will be able to make difficult choices at a stressful time. · Will be able to refuse or stop treatment, if that is what you would want, even if you could die. · Will be firm and confident with health professionals if needed. · Will ask questions to get necessary information. · Lives near you or agrees to travel to you if needed. Your family may help you make medical decisions while you can still be part of that process. But it is important to choose one person to be your health care agent in case you are not able to make decisions for yourself. If you don't fill out the legal form and name a health care agent, the decisions your family can make may be limited. Who will make decisions for you if you do not have a health care agent? If you don't have a health care agent or a living will, your family members may disagree about your medical care. And then some medical professionals who may not know you as well might have to make decisions for you. In some cases, a  makes the decisions. When you name a health care agent, it is very clear who has the power to make health decisions for you. How do you name a health care agent? You name your health care agent on a legal form. It is usually called a medical power of . Ask your hospital, state bar association, or office on aging where to find these forms. You must sign the form to make it legal. Some states require you to get the form notarized. This means that a person called a  watches you sign the form and then he or she signs the form. Some states also require that two or more witnesses sign the form. Be sure to tell your family members and doctors who your health care agent is. Keep your forms in a safe place. But make sure that your loved ones know where the forms are. This could be in your desk where you keep other important papers. Where can you learn more? Go to http://kevin-danny.info/.   Enter [id number] in the search box to learn more about \"Learning About [address]. \"  Current as of: February 24, 2016  Content Version: 11.1  © 5326-2104 [employer]. Care instructions adapted under license by [employer] (which disclaims liability or warranty for this information). If you have questions about a medical condition or this instruction, always ask your healthcare professional. [place] [address] any warranty or liability for your use of this information.

## 2017-03-14 NOTE — ACP (ADVANCE CARE PLANNING)
Discussed Advance Medical Directive (AMD) and it's importance. Patient  and Patient's son states that the patient has Advance Medical Directive. Patient and patient's son will bring a copy to next appointment visit.

## 2017-03-14 NOTE — PROGRESS NOTES
Patient admitted to [facility] on 3/6/17-3/6/17 for Contusion of Occipital,  Post Fall. I met the patient and Patient's son in  the office today 3/14/17. Patient and Patient's son state that patient  is doing very well. Patient looks well. No S/S of acute distress noted on patient at this time. No concerns, assistance and/or questions verbalized by patient and patient's son  at this time. Office contact information was provided for future reference, assistance, concerns, or further questions. Fall Precaution/Prevention care instructions reviewed with  patient and patient's son  And they both verbalized understanding. Will continue to follow.

## 2017-03-14 NOTE — PROGRESS NOTES
SUBJECTIVE    Chief Complaint   Patient presents with    Annual Wellness Visit    Head Injury     HPI:   She comes in with her son for follow up of hematoma after fall on the back of her head after tripping. There was no LOC or change in mental status. She went to the ER and CT of head did not show any acute changes. She is in her base line for mental and physical status. Her hematoma is sore; but denies any HA. She went to the ER on 1/16/17 for constipation and at that time she was given antibiotics for UTI also. She may be having some burning on urination off and per her son. She is not drinking much fluids. She has been unable to provide urine until now. She offers no other complaints. Her DM is controlled w/o medications. Past Medical History:   Diagnosis Date    Back pain     DJD (degenerative joint disease)     mostly of knees    DM (diabetes mellitus) (HCC)     Fatigue     GERD (gastroesophageal reflux disease)     w/chronic pulmonary fibrosis    Herpes zoster     Lumbar spondylosis     LBP    Motion sickness     chronic    PVCs (premature ventricular contractions)     recurrent    Varicose veins     Venous insufficiency     Lt LE     No past surgical history on file. Current Outpatient Prescriptions   Medication Sig    Ca-D3-mag-zinc--kristina-boron (CALTRATE 600+D PLUS MINERALS) 600 mg calcium- 800 unit-40 mg chew Take 1 Tab by mouth two (2) times a day.  psyllium (FIBER) 0.52 gram capsule Take 1 Cap by mouth daily.  polyethylene glycol (MIRALAX) 17 gram/dose powder Take 17 g by mouth daily as needed.  ascorbic acid (VITAMIN C) 1,000 mg tablet Take 1,000 mg by mouth daily.  FERROUS FUMARATE/VIT BCOMP&C (SUPER B COMPLEX PO) Take 1 tablet by mouth daily.  fish oil-omega-3 fatty acids (FISH OIL) 300-1,000 mg cap Take 1 capsule by mouth daily.  coenzyme q10-vitamin e (COQ10 ) 100-100 mg-unit cap Take  by mouth.  Two capsules in the morning and two capsules in the evening daily.  GLUCOSAMINE/CHONDROITN/NA/C/SE (JOINT FORMULA PO) Take  by mouth. Two capsules in the morning and two capsules in the evening. No current facility-administered medications for this visit. Allergies: Iodine    ROS:   Constitutional: No fever, chills, night sweats, malaise. Cardiovascular: No angina, palpitations, PND, orthopnea, lightheadedness, edema, claudication. Respiratory: No pleurisy, hemoptysis, unusual sputum. Gastrointestinal: no pain, melena, hematochezia. Psychiatric: No agitation, confusion/disorientation, suicidal or homicidal ideation. Musculoskeletal: chronic back problem and gait difficulty. Head injury as above. OBJECTIVE  Constitutional: General Appearance:  well developed, well nourished, nontoxic, in no acute distress. Visit Vitals    /70 (BP 1 Location: Left arm, BP Patient Position: Sitting)    Pulse 88    Temp 97.6 °F (36.4 °C) (Oral)    Resp 15    Ht 5' 5\" (1.651 m)    Wt 132 lb (59.9 kg)    SpO2 97%    BMI 21.97 kg/m2       Pulmonary: Respiratory effort: normal; no dyspnea, no retractions, no accessory muscle use. Auscultation: normal & symmetrical air exchange; coarse rales, most prominent at Rt lower area; no rhonchi; no wheeze; no rubs    Cardiovascular: Auscultation: RRR; no murmur, rubs; S4 gallop unchanged. Extremities: no edema. GI[de-identified] Normal bowel sounds. No masses; no tenderness; no rebound/rigidity; no CVA tenderness. No hepatosplenomegaly. Bladder: no fullnesses, tenderness or palpable masses. Psychiatric: Oriented to time, place and person. Neurologic Exam: CN I to XII: intact. DTR: symmetrical & normal.    Musculoskeletal: There is 3 cm mildly tender soft hematoma on posterior head. No break in scalp. .  Neck-supple.   Gait- walks with cane as in the past.     Lab Results   Component Value Date/Time    WBC 6.8 12/07/2016 02:52 PM    HGB 12.6 12/07/2016 02:52 PM    HCT 39.5 12/07/2016 02:52 PM    PLATELET 537 20/22/8550 02:52 PM    MCV 87.0 12/07/2016 02:52 PM     Lab Results   Component Value Date/Time    Sodium 137 12/07/2016 02:52 PM    Potassium 4.2 12/07/2016 02:52 PM    Chloride 101 12/07/2016 02:52 PM    CO2 27 12/07/2016 02:52 PM    Anion gap 9 12/07/2016 02:52 PM    Glucose 90 12/07/2016 02:52 PM    BUN 16 12/07/2016 02:52 PM    Creatinine 0.91 12/07/2016 02:52 PM    BUN/Creatinine ratio 18 12/07/2016 02:52 PM    GFR est AA >60 12/07/2016 02:52 PM    GFR est non-AA 58 12/07/2016 02:52 PM    Calcium 9.4 12/07/2016 02:52 PM    Bilirubin, total 0.6 12/07/2016 02:52 PM    AST (SGOT) 15 12/07/2016 02:52 PM    Alk. phosphatase 84 12/07/2016 02:52 PM    Protein, total 8.2 12/07/2016 02:52 PM    Albumin 3.9 12/07/2016 02:52 PM    Globulin 4.3 12/07/2016 02:52 PM    A-G Ratio 0.9 12/07/2016 02:52 PM    ALT (SGPT) 12 12/07/2016 02:52 PM     Lab Results   Component Value Date/Time    Hemoglobin A1c 6.1 07/21/2016 03:40 PM     Lab Results   Component Value Date/Time    TSH 4.67 12/07/2016 02:52 PM     3/6/17  CT of head: Atrophy and chronic small vessel changes.  Otherwise, unremarkable noncontrast head CT. Diabetic foot exam:     Left: Reflexes 2+     Vibratory sensation diminished    Proprioception normal   Sharp/dull discrimination diminished    Filament test normal sensation with micro filament   Pulse DP: 2+ (normal)   Pulse PT: 1+ (weak)   Deformities: Mild - bunion  Right: Reflexes 2+   Vibratory sensation diminished   Proprioception normal   Sharp/dull discrimination diminished   Filament test normal sensation with micro filament   Pulse DP: 2+ (normal)   Pulse PT: 1+ (weak)   Deformities: Mild - Bunion    ASSESSMENT    1. Closed head injury, initial encounter    2. Hematoma of scalp, initial encounter    3. Type 2 diabetes mellitus without complication, without long-term current use of insulin (HCC)    4. Elevated TSH    5. Balance disorder    6. Acute cystitis without hematuria    7. Medicare annual wellness visit, subsequent        PLAN    Orders Placed This Encounter    CULTURE, URINE    MICROALBUMIN, UR, RAND W/ MICROALBUMIN/CREA RATIO    CBC WITH AUTOMATED DIFF    TSH 3RD GENERATION    T4, FREE    HEMOGLOBIN A1C WITH EAG    URINALYSIS W/ RFLX MICROSCOPIC     DIABETES FOOT EXAM   She will return tomorrow for BW. Discussed DDx, follow-up & work-up. Avoid fall and injury. Increase fluid intake. Health risk from non adherence discussed. Patient/ sons voiced understanding. Follow up in as scheduled, prn sooner. I have reviewed the medicare wellness evaluation by Carlee Zaldivar RN. I have discussed with the patient about Health maintenance items. She declined immunizations. She will schedule OV with eye doctor. Follow-up Disposition:  Return in about 2 months (around 5/14/2017).       Roslainda Melgoza MD

## 2017-03-14 NOTE — PROGRESS NOTES
Kamaljit Sutton is a 80 y.o.  female presents today for office visit for follow up. Pt is in Room # 5      1. Have you been to the ER, urgent care clinic since your last visit? Hospitalized since your last visit? 3wks SPA for fall    2. Have you seen or consulted any other health care providers outside of the [address] [place] [address] since your last visit? Include any pap smears or colon screening. No    No Patient Care Coordination Note on file.       Health Maintenance reviewed      Visual Acuity Screening    Right eye Left eye Both eyes   Without correction:      With correction: 20/40 20/40 20/50

## 2017-03-14 NOTE — PROGRESS NOTES
NN AWV:    Marimar Mcclellan is a 80 y.o. female and presents for Annual Medicare Wellness Visit. Problem List: Reviewed with patient and discussed risk factors. 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 SH: Reviewed with patient  Social History   Substance Use Topics    Smoking status: Never Smoker    Smokeless tobacco: Never Used    Alcohol use No       FH: Reviewed with patient  No family history on file. Medications/Allergies: Reviewed with patient  Current Outpatient Prescriptions on File Prior to Visit   Medication Sig Dispense Refill    Ca-D3-mag-zinc--kristina-boron (CALTRATE 600+D PLUS MINERALS) 600 mg calcium- 800 unit-40 mg chew Take 1 Tab by mouth two (2) times a day. 60 Tab 3    psyllium (FIBER) 0.52 gram capsule Take 1 Cap by mouth daily. 90 Cap 1    polyethylene glycol (MIRALAX) 17 gram/dose powder Take 17 g by mouth daily as needed. 510 g 0    ascorbic acid (VITAMIN C) 1,000 mg tablet Take 1,000 mg by mouth daily.  FERROUS FUMARATE/VIT BCOMP&C (SUPER B COMPLEX PO) Take 1 tablet by mouth daily.  fish oil-omega-3 fatty acids (FISH OIL) 300-1,000 mg cap Take 1 capsule by mouth daily.  coenzyme q10-vitamin e (COQ10 ) 100-100 mg-unit cap Take  by mouth. Two capsules in the morning and two capsules in the evening daily.       GLUCOSAMINE/CHONDROITN/NA/C/SE (JOINT FORMULA PO) Take  by mouth. Two capsules in the morning and two capsules in the evening. No current facility-administered medications on file prior to visit. Allergies   Allergen Reactions    Shellfish Derived Anaphylaxis    Salonpas-Hot [Capsaicin] Rash    Iodine Not Reported This Time       Objective: There were no vitals taken for this visit. There is no height or weight on file to calculate BMI. Assessment of cognitive impairment: Alert and oriented x 3    Depression Screen:   PHQ 2 / 9, over the last two weeks 3/14/2017   Little interest or pleasure in doing things Not at all   Feeling down, depressed or hopeless Several days   Total Score PHQ 2 1       Fall Risk Assessment:    Fall Risk Assessment, last 12 mths 3/14/2017   Able to walk? Yes   Fall in past 12 months? Yes   Fall with injury? Yes   Number of falls in past 12 months 3   Fall Risk Score 4       Abuse Screening   Abuse Screening Questionnaire 3/14/2017   Do you ever feel afraid of your partner? N   Are you in a relationship with someone who physically or mentally threatens you? N   Is it safe for you to go home? Y         Functional Ability:   Does the patient exhibit a steady gait? No. Patient uses walker or cane. Steady with walker as per patient and patient's son. How long did it take the patient to get up and walk from a sitting position? 6 seconds   Is the patient self reliant?  (ie can do own laundry, meals, household chores)  No, patient's son assists. Does the patient handle his/her own medications? yes     Does the patient handle his/her own money? No, patient's son assists. Is the patients home safe (ie good lighting, handrails on stairs and bath, etc.)? yes     Did you notice or did patient express any hearing difficulties? Yes per patient and patient's son. Patient's son states that patient has a hearing aid appointment tomorrow.       Did you notice or did patient express any vision difficulties?   no     Were distance and reading eye charts used? yes       Advance Care Planning:   Patient was offered the opportunity to discuss advance care planning:  yes     Does patient have an Advance Directive:  yes   If no, did you provide information on Caring Connections?  no       Plan:      No orders of the defined types were placed in this encounter. Health Maintenance   Topic Date Due    FOOT EXAM Q1  01/11/1935    EYE EXAM RETINAL OR DILATED Q1  01/11/1935    GLAUCOMA SCREENING Q2Y  01/11/1990    MICROALBUMIN Q1  09/11/2015    HEMOGLOBIN A1C Q6M  01/21/2017    LIPID PANEL Q1  02/28/2017    Pneumococcal 65+ Low/Medium Risk (2 of 2 - PPSV23) 03/14/2018    MEDICARE YEARLY EXAM  03/15/2018    DTaP/Tdap/Td series (2 - Td) 03/14/2027    OSTEOPOROSIS SCREENING (DEXA)  Addressed    ZOSTER VACCINE AGE 60>  Addressed    INFLUENZA AGE 9 TO ADULT  Addressed     Patient refused pneumonia, Influenza and DTAP vaccine. For Eye exam and Glaucoma screening, patient and patient's son will schedule appointment to see eye doctor. *Patient verbalized understanding and agreement with the plan. A copy of the After Visit Summary with personalized health plan was given to the patient today.

## 2017-03-15 ENCOUNTER — HOSPITAL ENCOUNTER (OUTPATIENT)
Dept: LAB | Age: 82
Discharge: HOME | End: 2017-03-15

## 2017-03-15 PROCEDURE — 99001 SPECIMEN HANDLING PT-LAB: CPT | Performed by: FAMILY MEDICINE

## 2017-03-17 ENCOUNTER — TELEPHONE (OUTPATIENT)
Dept: FAMILY MEDICINE CLINIC | Facility: CLINIC | Age: 82
End: 2017-03-17

## 2017-03-17 LAB
ALBUMIN/CREAT UR: 18.3 MG/G CREAT (ref 0–30)
APPEARANCE UR: ABNORMAL
BACTERIA #/AREA URNS HPF: ABNORMAL /[HPF]
BACTERIA UR CULT: NORMAL
BASOPHILS # BLD AUTO: 0 X10E3/UL (ref 0–0.2)
BASOPHILS NFR BLD AUTO: 1 %
BILIRUB UR QL STRIP: NEGATIVE
CASTS URNS QL MICRO: ABNORMAL /LPF
COLOR UR: YELLOW
CREAT UR-MCNC: 176.3 MG/DL
CRYSTALS URNS MICRO: ABNORMAL
EOSINOPHIL # BLD AUTO: 0.1 X10E3/UL (ref 0–0.4)
EOSINOPHIL NFR BLD AUTO: 2 %
EPI CELLS #/AREA URNS HPF: ABNORMAL /HPF
ERYTHROCYTE [DISTWIDTH] IN BLOOD BY AUTOMATED COUNT: 14.8 % (ref 12.3–15.4)
EST. AVERAGE GLUCOSE BLD GHB EST-MCNC: 120 MG/DL
GLUCOSE UR QL: NEGATIVE
HBA1C MFR BLD: 5.8 % (ref 4.8–5.6)
HCT VFR BLD AUTO: 36.8 % (ref 34–46.6)
HGB BLD-MCNC: 11.8 G/DL (ref 11.1–15.9)
HGB UR QL STRIP: NEGATIVE
IMM GRANULOCYTES # BLD: 0 X10E3/UL (ref 0–0.1)
IMM GRANULOCYTES NFR BLD: 0 %
KETONES UR QL STRIP: NEGATIVE
LEUKOCYTE ESTERASE UR QL STRIP: ABNORMAL
LYMPHOCYTES # BLD AUTO: 1.5 X10E3/UL (ref 0.7–3.1)
LYMPHOCYTES NFR BLD AUTO: 23 %
MCH RBC QN AUTO: 27.6 PG (ref 26.6–33)
MCHC RBC AUTO-ENTMCNC: 32.1 G/DL (ref 31.5–35.7)
MCV RBC AUTO: 86 FL (ref 79–97)
MICRO URNS: ABNORMAL
MICROALBUMIN UR-MCNC: 32.3 UG/ML
MONOCYTES # BLD AUTO: 0.5 X10E3/UL (ref 0.1–0.9)
MONOCYTES NFR BLD AUTO: 8 %
MUCOUS THREADS URNS QL MICRO: PRESENT
NEUTROPHILS # BLD AUTO: 4.4 X10E3/UL (ref 1.4–7)
NEUTROPHILS NFR BLD AUTO: 66 %
NITRITE UR QL STRIP: NEGATIVE
PH UR STRIP: 6 [PH] (ref 5–7.5)
PLATELET # BLD AUTO: 166 X10E3/UL (ref 150–379)
PROT UR QL STRIP: NEGATIVE
RBC # BLD AUTO: 4.27 X10E6/UL (ref 3.77–5.28)
RBC #/AREA URNS HPF: ABNORMAL /HPF
SP GR UR: 1.02 (ref 1–1.03)
T4 FREE SERPL-MCNC: 1.04 NG/DL (ref 0.82–1.77)
TSH SERPL DL<=0.005 MIU/L-ACNC: 4 UIU/ML (ref 0.45–4.5)
UNIDENT CRYS URNS QL MICRO: PRESENT
UROBILINOGEN UR STRIP-MCNC: 0.2 MG/DL (ref 0.2–1)
WBC # BLD AUTO: 6.6 X10E3/UL (ref 3.4–10.8)
WBC #/AREA URNS HPF: >30 /HPF

## 2017-03-17 RX ORDER — CIPROFLOXACIN 250 MG/1
250 TABLET, FILM COATED ORAL EVERY 12 HOURS
Qty: 14 TAB | Refills: 0 | Status: SHIPPED | OUTPATIENT
Start: 2017-03-17 | End: 2017-03-24

## 2017-03-17 NOTE — PROGRESS NOTES
Blood work came back good. HgbA1c was even better- 5.8. U/A indicated infection; but culture showed mixed chadd. Since she has symptoms will treat with Cipro 250 bid for 7 days. Sent to pharmacy.

## 2017-03-17 NOTE — TELEPHONE ENCOUNTER
Spoke with Debora Monsalve son Lynn Sibley, Verified 2 patient identifiers. Spoke with patient in regards to lab results. Relayed Doctor's notes. Patient acknowledges understanding and voices no further questions or concerns at this time.

## 2017-04-21 ENCOUNTER — TELEPHONE (OUTPATIENT)
Dept: FAMILY MEDICINE CLINIC | Facility: CLINIC | Age: 82
End: 2017-04-21

## 2017-04-21 DIAGNOSIS — R53.81 PHYSICAL DECONDITIONING: ICD-10-CM

## 2017-04-21 DIAGNOSIS — Z91.81 AT HIGH RISK FOR FALLS: Primary | ICD-10-CM

## 2017-04-25 ENCOUNTER — PATIENT OUTREACH (OUTPATIENT)
Dept: FAMILY MEDICINE CLINIC | Facility: CLINIC | Age: 82
End: 2017-04-25

## 2017-04-25 NOTE — PROGRESS NOTES
Patient admitted to [facility] on 3/6/17-3/6/17 for Contusion of Occipital,  Post Fall. Contacted patient for hospital follow up. Introduced self, role and reason for call. Reached patient's son. Verified 2 patient identifiers (Name and Date of Birth). Patient 's son stated \" She is sleeping right now. She is doing very good. i would like to make appointment this Friday for shoulder pain, belching and follow up UTI\". Patient's son denied patient's having confusion, syncope, chest pain, shortness of breath, fever, and chills. Patient's son denied s/s of urinary infection and bleeding. Noted no hospitalization  admission post 30 days from discharge date 3/6/17. This episode is closed. Post Hospitalization Encounter Resolved. No concern, needs, and/or assistance at this time as per patient's son. Appointment is set this Friday 4/28/17 at 2pm. Patient's son aware of appointment and will transport patient to appointment. Opportunity to ask questions was provided. Contact information was provided for future reference, assistance, concerns, or further questions. Patient 's son kept the conversation short.

## 2017-04-28 ENCOUNTER — OFFICE VISIT (OUTPATIENT)
Dept: FAMILY MEDICINE CLINIC | Facility: CLINIC | Age: 82
End: 2017-04-28

## 2017-04-28 VITALS
BODY MASS INDEX: 21.52 KG/M2 | HEIGHT: 65 IN | DIASTOLIC BLOOD PRESSURE: 60 MMHG | OXYGEN SATURATION: 98 % | SYSTOLIC BLOOD PRESSURE: 108 MMHG | HEART RATE: 78 BPM | TEMPERATURE: 97 F | WEIGHT: 129.2 LBS | RESPIRATION RATE: 14 BRPM

## 2017-04-28 DIAGNOSIS — E11.9 TYPE 2 DIABETES MELLITUS WITHOUT COMPLICATION, WITHOUT LONG-TERM CURRENT USE OF INSULIN (HCC): Primary | ICD-10-CM

## 2017-04-28 DIAGNOSIS — K21.9 GASTROESOPHAGEAL REFLUX DISEASE WITHOUT ESOPHAGITIS: ICD-10-CM

## 2017-04-28 DIAGNOSIS — R63.0 ANOREXIA: ICD-10-CM

## 2017-04-28 DIAGNOSIS — R62.7 FTT (FAILURE TO THRIVE) IN ADULT: ICD-10-CM

## 2017-04-28 DIAGNOSIS — N30.00 ACUTE CYSTITIS WITHOUT HEMATURIA: ICD-10-CM

## 2017-04-28 RX ORDER — RANITIDINE 150 MG/1
150 TABLET, FILM COATED ORAL 2 TIMES DAILY
Qty: 60 TAB | Refills: 1 | Status: SHIPPED | OUTPATIENT
Start: 2017-04-28 | End: 2017-07-28

## 2017-04-28 RX ORDER — CYPROHEPTADINE HYDROCHLORIDE 4 MG/1
4 TABLET ORAL
Qty: 30 TAB | Refills: 2 | Status: SHIPPED | OUTPATIENT
Start: 2017-04-28 | End: 2017-06-30

## 2017-04-28 RX ORDER — CEPHALEXIN 500 MG/1
500 CAPSULE ORAL 2 TIMES DAILY
Qty: 20 CAP | Refills: 0 | Status: SHIPPED | OUTPATIENT
Start: 2017-04-28 | End: 2017-05-08

## 2017-04-28 NOTE — PROGRESS NOTES
SUBJECTIVE    Chief Complaint   Patient presents with    Diabetes    Other     follow up     HPI:     She comes in with her son and daughter for follow up of UTI, Constipation and FTT. The patient says that she is fine. She is does not want to eat or drink much. She is getting weaker and losing weight. Cannot take Miralax due to GERD like symptoms, regurgitation and belching after she takes it. Family thinks that she does not want to eat or drink because of this. Urine still smells and she did not take 7 days of last antibiotics. Her chronic back pain is unchanged. She has DJD of joints also. Past Medical History:   Diagnosis Date    Back pain     DJD (degenerative joint disease)     mostly of knees    DM (diabetes mellitus) (HCC)     Fatigue     GERD (gastroesophageal reflux disease)     w/chronic pulmonary fibrosis    Herpes zoster     Lumbar spondylosis     LBP    Motion sickness     chronic    PVCs (premature ventricular contractions)     recurrent    Varicose veins     Venous insufficiency     Lt LE     No past surgical history on file. Current Outpatient Prescriptions   Medication Sig    Ca-D3-mag-zinc--kristina-boron (CALTRATE 600+D PLUS MINERALS) 600 mg calcium- 800 unit-40 mg chew Take 1 Tab by mouth two (2) times a day.  polyethylene glycol (MIRALAX) 17 gram/dose powder Take 17 g by mouth daily as needed.  FERROUS FUMARATE/VIT BCOMP&C (SUPER B COMPLEX PO) Take 1 tablet by mouth daily.  coenzyme q10-vitamin e (COQ10 ) 100-100 mg-unit cap Take  by mouth. Two capsules in the morning and two capsules in the evening daily.  GLUCOSAMINE/CHONDROITN/NA/C/SE (JOINT FORMULA PO) Take  by mouth. Two capsules in the morning and two capsules in the evening.  psyllium (FIBER) 0.52 gram capsule Take 1 Cap by mouth daily.  ascorbic acid (VITAMIN C) 1,000 mg tablet Take 1,000 mg by mouth daily.     fish oil-omega-3 fatty acids (FISH OIL) 300-1,000 mg cap Take 1 capsule by mouth daily. No current facility-administered medications for this visit. Allergies: Iodine    ROS:   Constitutional: No fever, chills, night sweats, malaise. Cardiovascular: No angina, palpitations, PND, orthopnea, lightheadedness, edema, claudication. Respiratory: No pleurisy, hemoptysis, unusual sputum. Gastrointestinal: no pain, melena, hematochezia. Psychiatric: No agitation, confusion/disorientation, suicidal or homicidal ideation. Musculoskeletal: chronic back problem and gait difficulty. DJD of joints. OBJECTIVE  Constitutional: General Appearance:  well developed, well nourished, nontoxic, in no acute distress. Visit Vitals    /60 (BP 1 Location: Left arm, BP Patient Position: Sitting)    Pulse 78    Temp 97 °F (36.1 °C) (Oral)    Resp 14    Ht 5' 5\" (1.651 m)    Wt 129 lb 3.2 oz (58.6 kg)    SpO2 98%    BMI 21.5 kg/m2     Pulmonary: Respiratory effort: normal; no dyspnea, no retractions, no accessory muscle use. Auscultation: normal & symmetrical air exchange; coarse rales, most prominent at Rt lower area; no rhonchi; no wheeze; no rubs. Cardiovascular: Auscultation: RRR; no murmur, rubs; S4 gallop unchanged. Extremities: no edema. GI[de-identified] Normal bowel sounds. No masses; no tenderness; no rebound/rigidity; no CVA tenderness. No hepatosplenomegaly. Bladder: no fullnesses, tenderness or palpable masses. Psychiatric: Oriented to time, place and person. Musculoskeletal: NC/AT. Neck-supple. Gait- walks with cane. Shoulders: No heat, effusion, redness or swelling. Decreased ROM with pain. No instability.      Lab Results   Component Value Date/Time    WBC 6.6 03/15/2017 01:45 AM    HGB 11.8 03/15/2017 01:45 AM    HCT 36.8 03/15/2017 01:45 AM    PLATELET 160 20/93/5236 01:45 AM    MCV 86 03/15/2017 01:45 AM     Lab Results   Component Value Date/Time    Sodium 137 12/07/2016 02:52 PM    Potassium 4.2 12/07/2016 02:52 PM    Chloride 101 12/07/2016 02:52 PM    CO2 27 12/07/2016 02:52 PM    Anion gap 9 12/07/2016 02:52 PM    Glucose 90 12/07/2016 02:52 PM    BUN 16 12/07/2016 02:52 PM    Creatinine 0.91 12/07/2016 02:52 PM    BUN/Creatinine ratio 18 12/07/2016 02:52 PM    GFR est AA >60 12/07/2016 02:52 PM    GFR est non-AA 58 12/07/2016 02:52 PM    Calcium 9.4 12/07/2016 02:52 PM    Bilirubin, total 0.6 12/07/2016 02:52 PM    AST (SGOT) 15 12/07/2016 02:52 PM    Alk. phosphatase 84 12/07/2016 02:52 PM    Protein, total 8.2 12/07/2016 02:52 PM    Albumin 3.9 12/07/2016 02:52 PM    Globulin 4.3 12/07/2016 02:52 PM    A-G Ratio 0.9 12/07/2016 02:52 PM    ALT (SGPT) 12 12/07/2016 02:52 PM     Lab Results   Component Value Date/Time    Hemoglobin A1c 5.8 03/15/2017 01:45 AM     Lab Results   Component Value Date/Time    TSH 4.000 03/15/2017 01:45 AM       ASSESSMENT    1. Type 2 diabetes mellitus without complication, without long-term current use of insulin ([facility] [address]    2. Anorexia    3. Acute cystitis without hematuria    4. Gastroesophageal reflux disease without esophagitis    5. FTT (failure to thrive) in adult        PLAN    Orders Placed This Encounter    CULTURE, URINE    CBC WITH AUTOMATED DIFF    METABOLIC PANEL, COMPREHENSIVE    URINALYSIS W/ RFLX MICROSCOPIC    cyproheptadine (PERIACTIN) 4 mg tablet    raNITIdine (ZANTAC) 150 mg tablet    cephALEXin (KEFLEX) 500 mg capsule     Pharmacologic Management: Medications reviewed with the family/ patient. Will start on Keflex 500 bid if U/A shows infection after urine is obtained. Periactin 4 mg HS, will change to Doxepin if not effective. Zantac 150 bid. Discussed DDx, follow-up & work-up. Discussed risk/bebenefit/side effect. Declines referral to PT or to GI. Avoid fall and injury. Increase fluid intake. Follow up in as scheduled, prn sooner. PRN to ER. Health risk from non adherence discussed. Patient/ son/ daughter voiced understanding.     Follow-up Disposition:  Return in about 2 months (around 6/28/2017).       Anna Santana MD

## 2017-04-28 NOTE — PROGRESS NOTES
Carolina Weems is a 80 y.o.  female presents today for office visit for follow up. Pt is in Room # 4      1. Have you been to the ER, urgent care clinic since your last visit? Hospitalized since your last visit? No    2. Have you seen or consulted any other health care providers outside of the [address] [place] [address] since your last visit? Include any pap smears or colon screening. No    No Patient Care Coordination Note on file.

## 2017-05-04 LAB
APPEARANCE UR: ABNORMAL
BACTERIA #/AREA URNS HPF: ABNORMAL /[HPF]
BACTERIA UR CULT: ABNORMAL
BILIRUB UR QL STRIP: NEGATIVE
CASTS URNS QL MICRO: ABNORMAL /LPF
COLOR UR: YELLOW
CRYSTALS URNS MICRO: ABNORMAL
EPI CELLS #/AREA URNS HPF: ABNORMAL /HPF
GLUCOSE UR QL: NEGATIVE
HGB UR QL STRIP: NEGATIVE
KETONES UR QL STRIP: NEGATIVE
LEUKOCYTE ESTERASE UR QL STRIP: ABNORMAL
MICRO URNS: ABNORMAL
MUCOUS THREADS URNS QL MICRO: PRESENT
NITRITE UR QL STRIP: NEGATIVE
PH UR STRIP: 6.5 [PH] (ref 5–7.5)
PROT UR QL STRIP: NEGATIVE
RBC #/AREA URNS HPF: ABNORMAL /HPF
SP GR UR: 1.02 (ref 1–1.03)
UNIDENT CRYS URNS QL MICRO: PRESENT
UROBILINOGEN UR STRIP-MCNC: 0.2 MG/DL (ref 0.2–1)
WBC #/AREA URNS HPF: ABNORMAL /HPF

## 2017-05-05 ENCOUNTER — TELEPHONE (OUTPATIENT)
Dept: FAMILY MEDICINE CLINIC | Facility: CLINIC | Age: 82
End: 2017-05-05

## 2017-05-05 NOTE — TELEPHONE ENCOUNTER
Ordered BW report has not been received. Urine tests show that she has infection, which is sensitive to Keflex (the antibiotics given). Please continue and finish the course. Will follow. Spoke with pt's son in regards to results. Pt's acknowledges understanding and voices no concerns at this time.

## 2017-05-05 NOTE — PROGRESS NOTES
Ordered BW report has not been received. Urine tests show that she has infection, which is sensitive to Keflex (the antibiotics given). Please continue and finish the course. Will follow.

## 2017-05-05 NOTE — TELEPHONE ENCOUNTER
Spoke with pt's son in regards to BW overdue. Pt's son states ok with having BW performed by [facility]. Pt's son voices no concerns at this time.

## 2017-05-30 ENCOUNTER — TELEPHONE (OUTPATIENT)
Dept: FAMILY MEDICINE CLINIC | Facility: CLINIC | Age: 82
End: 2017-05-30

## 2017-05-30 DIAGNOSIS — D69.1 ABNORMAL PLATELETS (HCC): ICD-10-CM

## 2017-05-30 DIAGNOSIS — R73.09 LOW GLUCOSE LEVEL: Primary | ICD-10-CM

## 2017-05-30 NOTE — TELEPHONE ENCOUNTER
Call placed to Mary  and informed of BW orders needed. This writer was advised to fax order to [phone].

## 2017-05-30 NOTE — TELEPHONE ENCOUNTER
Spoke with pt's son in regards to lab results. Two pt identifier's and permission to release verified. Relayed 's notes. Pt's son acknowledges understanding and voices no concerns at this time. ----- Message from Emeka Koroma MD sent at 5/27/2017  8:53 PM EDT -----  Platelet count and glucose were low. Need to recheck these within one week.

## 2017-06-30 ENCOUNTER — OFFICE VISIT (OUTPATIENT)
Dept: FAMILY MEDICINE CLINIC | Facility: CLINIC | Age: 82
End: 2017-06-30

## 2017-06-30 VITALS
HEART RATE: 60 BPM | DIASTOLIC BLOOD PRESSURE: 60 MMHG | TEMPERATURE: 97.8 F | RESPIRATION RATE: 14 BRPM | OXYGEN SATURATION: 97 % | WEIGHT: 125.2 LBS | BODY MASS INDEX: 20.86 KG/M2 | HEIGHT: 65 IN | SYSTOLIC BLOOD PRESSURE: 106 MMHG

## 2017-06-30 DIAGNOSIS — N30.00 ACUTE CYSTITIS WITHOUT HEMATURIA: ICD-10-CM

## 2017-06-30 DIAGNOSIS — E11.9 TYPE 2 DIABETES MELLITUS WITHOUT COMPLICATION, WITHOUT LONG-TERM CURRENT USE OF INSULIN (HCC): ICD-10-CM

## 2017-06-30 DIAGNOSIS — R63.4 WEIGHT LOSS: Primary | ICD-10-CM

## 2017-06-30 RX ORDER — MECLIZINE HYDROCHLORIDE 25 MG/1
TABLET ORAL
COMMUNITY
End: 2017-08-08

## 2017-06-30 RX ORDER — LYSINE HCL 500 MG
TABLET ORAL
COMMUNITY
End: 2017-07-28

## 2017-06-30 RX ORDER — ACETAMINOPHEN, DIPHENHYDRAMINE HCL 500; 25 MG/1; MG/1
1 TABLET, COATED ORAL DAILY
COMMUNITY

## 2017-06-30 RX ORDER — DOXEPIN HYDROCHLORIDE 10 MG/ML
5-10 SOLUTION ORAL
Qty: 118 ML | Refills: 0 | Status: SHIPPED | OUTPATIENT
Start: 2017-06-30 | End: 2017-07-28

## 2017-06-30 RX ORDER — MENTHOL
1000 GEL (GRAM) TOPICAL DAILY
COMMUNITY
End: 2017-06-30

## 2017-06-30 RX ORDER — VITAMIN E CAP 100 UNIT 100 UNIT
100 CAP ORAL DAILY
COMMUNITY

## 2017-06-30 NOTE — MR AVS SNAPSHOT
Visit Information Date & Time Provider Department Dept. Phone Encounter #  
 6/30/2017 10:15 AM Christiana Bloom MD [facility] [phone] [id number] Follow-up Instructions Return in about 1 month (around 7/30/2017). Your Appointments 7/28/2017 11:00 AM  
Follow Up with Christiana Bloom MD  
[facility] ([address]) Appt Note: Return in about 1 month (around 7/30/2017). [address] [place] [address] [phone]  
[address] [place] [phone] Upcoming Health Maintenance Date Due  
 LIPID PANEL Q1 2/28/2017 GLAUCOMA SCREENING Q2Y 8/31/2017* EYE EXAM RETINAL OR DILATED Q1 8/31/2017* INFLUENZA AGE 9 TO ADULT 8/1/2017 HEMOGLOBIN A1C Q6M 9/15/2017 Pneumococcal 65+ Low/Medium Risk (2 of 2 - PPSV23) 3/14/2018 FOOT EXAM Q1 3/14/2018 MEDICARE YEARLY EXAM 3/15/2018 MICROALBUMIN Q1 3/15/2018 DTaP/Tdap/Td series (2 - Td) 3/14/2027 *Topic was postponed. The date shown is not the original due date. Allergies as of 6/30/2017  Review Complete On: 6/30/2017 By: Christiana Bloom MD  
  
 Severity Noted Reaction Type Reactions Shellfish Derived High 01/06/2015    Anaphylaxis Salonpas-hot [Capsaicin] Medium 08/28/2015   Topical Rash Iodine    Not Reported This Time Current Immunizations  Reviewed on 2/3/2017 No immunizations on file. Not reviewed this visit You Were Diagnosed With   
  
 Codes Comments Acute cystitis without hematuria    -  Primary ICD-10-CM: N30.00 ICD-9-CM: 595.0 Type 2 diabetes mellitus without complication, without long-term current use of insulin (HCC)     ICD-10-CM: E11.9 ICD-9-CM: 250.00 Vitals BP Pulse Temp Resp Height(growth percentile) Weight(growth percentile)  106/60 (BP 1 Location: Left arm, BP Patient Position: Sitting) 60 97.8 °F (36.6 °C) (Oral) 14 5' 5\" (1.651 m) 125 lb 3.2 oz (56.8 kg) SpO2 BMI OB Status Smoking Status 97% 20.83 kg/m2 Postmenopausal Never Smoker Vitals History BMI and BSA Data Body Mass Index Body Surface Area  
 20.83 kg/m 2 1.61 m 2 Preferred Pharmacy Pharmacy Name Phone [facility] [address] [place] [address], [address] Your Updated Medication List  
  
   
This list is accurate as of: 6/30/17 11:35 AM.  Always use your most recent med list.  
  
  
  
  
 Ca-D3-mag-zinc--kristina-boron 600 mg calcium- 800 unit-40 mg Chew Commonly known as:  CALTRATE 600+D PLUS MINERALS Take 1 Tab by mouth two (2) times a day. Calcium Carbonate-Vit D3-Min 600 mg calcium- 400 unit Tab Take  by mouth. COQ10  100-100 mg-unit Cap Generic drug:  coenzyme q10-vitamin e Take  by mouth. Two capsules in the morning and two capsules in the evening daily. doxepin 10 mg/mL solution Commonly known as:  SINEQUAN Take 0.5-1 mL by mouth nightly. ginkgo biloba 120 mg Tab Take  by mouth. JOINT FORMULA PO Take  by mouth. Two capsules in the morning and two capsules in the evening. meclizine 25 mg tablet Commonly known as:  ANTIVERT Take  by mouth three (3) times daily as needed. polyethylene glycol 17 gram/dose powder Commonly known as:  Abdirizak Nanny Take 17 g by mouth daily as needed. raNITIdine 150 mg tablet Commonly known as:  ZANTAC Take 1 Tab by mouth two (2) times a day. SUPER B COMPLEX PO Take 1 tablet by mouth daily. vitamin e 100 unit capsule Commonly known as:  E GEMS Take  by mouth daily. Prescriptions Sent to Pharmacy Refills  
 doxepin (SINEQUAN) 10 mg/mL solution 0 Sig: Take 0.5-1 mL by mouth nightly. Class: Normal  
 Pharmacy: [address], [address] Ph #: [phone]  Route: Oral  
  
 We Performed the Following REFERRAL TO OPHTHALMOLOGY [REF57 Custom] Comments:  
 Please evaluate patient for glaucoma screening. Follow-up Instructions Return in about 1 month (around 7/30/2017). To-Do List   
 06/30/2017 Microbiology:  CULTURE, URINE   
  
 06/30/2017 Lab:  URINALYSIS W/ RFLX MICROSCOPIC Referral Information Referral ID Referred By Referred To  
  
 [id number] [address], [address] Marlin Murrell Phone: [phone] Fax: [phone] Visits Status Start Date End Date 1 New Request 6/30/17 6/30/18 If your referral has a status of pending review or denied, additional information will be sent to support the outcome of this decision. Introducing [facility]! Santi Thomas introduces [employer] patient portal. Now you can access parts of your medical record, email your doctor's office, and request medication refills online. 1. In your internet browser, go to [email or web address] 2. Click on the First Time User? Click Here link in the Sign In box. You will see the New Member Sign Up page. 3. Enter your [employer] Access Code exactly as it appears below. You will not need to use this code after youve completed the sign-up process. If you do not sign up before the expiration date, you must request a new code. · [employer] Access Code: [email or web address] Expires: 9/28/2017 10:19 AM 
 
4. Enter the last four digits of your Social Security Number (xxxx) and Date of Birth (mm/dd/yyyy) as indicated and click Submit. You will be taken to the next sign-up page. 5. Create a [employer] ID. This will be your [employer] login ID and cannot be changed, so think of one that is secure and easy to remember. 6. Create a [employer] password. You can change your password at any time. 7. Enter your Password Reset Question and Answer.  This can be used at a later time if you forget your password. 8. Enter your e-mail address. You will receive e-mail notification when new information is available in [address] 9. Click Sign Up. You can now view and download portions of your medical record. 10. Click the Download Summary menu link to download a portable copy of your medical information. If you have questions, please visit the Frequently Asked Questions section of the [employer] website. Remember, [employer] is NOT to be used for urgent needs. For medical emergencies, dial 911. Now available from your iPhone and Android! Please provide this summary of care documentation to your next provider. Your primary care clinician is listed as [address] [place] [address]. If you have any questions after today's visit, please call [phone].

## 2017-06-30 NOTE — PROGRESS NOTES
Moira Cowart is a 80 y.o. female presents today for follow up. Fall Risk Screening: Completed    1. Have you been to the ER, urgent care clinic since your last visit? Hospitalized since your last visit? No    2. Have you seen or consulted any other health care providers outside of the [address] [place] [address] since your last visit? Include any pap smears or colon screening. No no    Health Maintenance reviewed.

## 2017-06-30 NOTE — PROGRESS NOTES
SUBJECTIVE    Chief Complaint   Patient presents with    Diabetes     HPI:     She comes in with her son for follow up of UTI, Constipation and FTT. The patient says that she is fine. She is does not want to eat or drink much. She is getting weaker and losing weight. She has GERD like symptoms, regurgitation and belching after she takes food. Family thinks that she does not want to eat or drink because of this. She did not try Periactin. Her chronic back pain is unchanged. She has DJD of joints also. Past Medical History:   Diagnosis Date    Back pain     DJD (degenerative joint disease)     mostly of knees    DM (diabetes mellitus) (HCC)     Fatigue     GERD (gastroesophageal reflux disease)     w/chronic pulmonary fibrosis    Herpes zoster     Lumbar spondylosis     LBP    Motion sickness     chronic    PVCs (premature ventricular contractions)     recurrent    Varicose veins     Venous insufficiency     Lt LE     History reviewed. No pertinent surgical history. Current Outpatient Prescriptions   Medication Sig    ginkgo biloba 120 mg tab Take  by mouth.  Calcium Carbonate-Vit D3-Min 600 mg calcium- 400 unit tab Take  by mouth.  vitamin e (E GEMS) 100 unit capsule Take  by mouth daily.  Ca-D3-mag-zinc--kristina-boron (CALTRATE 600+D PLUS MINERALS) 600 mg calcium- 800 unit-40 mg chew Take 1 Tab by mouth two (2) times a day.  polyethylene glycol (MIRALAX) 17 gram/dose powder Take 17 g by mouth daily as needed.  FERROUS FUMARATE/VIT BCOMP&C (SUPER B COMPLEX PO) Take 1 tablet by mouth daily.  coenzyme q10-vitamin e (COQ10 ) 100-100 mg-unit cap Take  by mouth. Two capsules in the morning and two capsules in the evening daily.  GLUCOSAMINE/CHONDROITN/NA/C/SE (JOINT FORMULA PO) Take  by mouth. Two capsules in the morning and two capsules in the evening.  meclizine (ANTIVERT) 25 mg tablet Take  by mouth three (3) times daily as needed.     raNITIdine (ZANTAC) 150 mg tablet Take 1 Tab by mouth two (2) times a day. No current facility-administered medications for this visit. Allergies: Iodine    ROS:   Constitutional: No fever, chills, night sweats, malaise. Cardiovascular: No angina, palpitations, PND, orthopnea, lightheadedness, edema, claudication. Respiratory: No pleurisy, hemoptysis, unusual sputum. Gastrointestinal: no pain, melena, hematochezia. Psychiatric: No agitation, confusion/disorientation, suicidal or homicidal ideation. Musculoskeletal: chronic back problem and gait difficulty. DJD of joints. OBJECTIVE  Constitutional: General Appearance:  well developed, well nourished, nontoxic, in no acute distress. Visit Vitals    /60 (BP 1 Location: Left arm, BP Patient Position: Sitting)    Pulse 60    Temp 97.8 °F (36.6 °C) (Oral)    Resp 14    Ht 5' 5\" (1.651 m)    Wt 125 lb 3.2 oz (56.8 kg)    SpO2 97%    BMI 20.83 kg/m2     Pulmonary: Respiratory effort: normal; no dyspnea, no retractions, no accessory muscle use. Auscultation: normal & symmetrical air exchange; coarse rales, most prominent at Rt lower area; no rhonchi; no wheeze; no rubs. Cardiovascular: Auscultation: RRR; no murmur, rubs; S4 gallop unchanged. Extremities: no edema. GI[de-identified] Normal bowel sounds. No masses; no tenderness; no rebound/rigidity; no CVA tenderness. No hepatosplenomegaly. Bladder: no fullnesses, tenderness or palpable masses. Psychiatric: Oriented to time, place and person. Musculoskeletal: NC/AT. Neck-supple. Gait- walks with cane. Shoulders: No heat, effusion, redness or swelling. Decreased ROM with pain. No instability.      Lab Results   Component Value Date/Time    WBC 7.1 05/26/2017 03:30 PM    HGB 12.8 05/26/2017 03:30 PM    HCT 39.8 05/26/2017 03:30 PM    PLATELET 99 06/12/3060 03:30 PM    MCV 87.7 05/26/2017 03:30 PM     Lab Results   Component Value Date/Time    Sodium 138 05/26/2017 03:30 PM    Potassium 4.1 05/26/2017 03:30 PM    Chloride 102 05/26/2017 03:30 PM    CO2 25 05/26/2017 03:30 PM    Anion gap 9 12/07/2016 02:52 PM    Glucose 60 05/26/2017 03:30 PM    BUN 11 05/26/2017 03:30 PM    Creatinine 0.7 05/26/2017 03:30 PM    BUN/Creatinine ratio 18 12/07/2016 02:52 PM    GFR est AA >60.0 05/26/2017 03:30 PM    GFR est non-AA >60 05/26/2017 03:30 PM    Calcium 10.1 05/26/2017 03:30 PM    Bilirubin, total 0.8 05/26/2017 03:30 PM    AST (SGOT) 22 05/26/2017 03:30 PM    Alk. phosphatase 68 05/26/2017 03:30 PM    Protein, total 8.0 05/26/2017 03:30 PM    Albumin 3.7 05/26/2017 03:30 PM    Globulin 4.3 12/07/2016 02:52 PM    A-G Ratio 0.9 12/07/2016 02:52 PM    ALT (SGPT) 16 05/26/2017 03:30 PM     Lab Results   Component Value Date/Time    Hemoglobin A1c 5.8 03/15/2017 01:45 AM     Lab Results   Component Value Date/Time    TSH 4.000 03/15/2017 01:45 AM       ASSESSMENT    1. Weight loss    2. Acute cystitis without hematuria    3. Type 2 diabetes mellitus without complication, without long-term current use of insulin (HCC)        PLAN    Orders Placed This Encounter    CULTURE, URINE    LIPID PANEL    URINALYSIS W/ RFLX MICROSCOPIC    REFERRAL TO OPHTHALMOLOGY    meclizine (ANTIVERT) 25 mg tablet    vitamin e (E GEMS) 100 unit capsule    doxepin (SINEQUAN) 10 mg/mL solution     Pharmacologic Management: Medications reviewed with the son/ patient. Discontinue all supplement pills except MVI, Vit E and Chondroitin, Doxepin 5 to 10 mg qHS. Zantac 150 bid. Try Ensure plus. Discussed DDx, follow-up & work-up. Discussed risk/bebenefit/side effect. Declines referral to PT or to GI. Avoid fall and injury. Increase fluid intake. Follow up in as scheduled, prn sooner. PRN to ER. Health risk from non adherence discussed. Patient/ son daughter voiced understanding. 25 minutes were spent on face to face time with this patient for the aforementioned problems, diagnoses and management plans.  >50% of the time was spent counseling and coordinating care. Follow-up Disposition:  Return in about 1 month (around 7/30/2017).       Lorin Palencia MD

## 2017-07-02 LAB
ALBUMIN SERPL-MCNC: 3.9 G/DL (ref 3.2–4.6)
ALBUMIN/GLOB SERPL: 1.1 {RATIO} (ref 1.2–2.2)
ALP SERPL-CCNC: 55 IU/L (ref 39–117)
ALT SERPL-CCNC: 7 IU/L (ref 0–32)
APPEARANCE UR: ABNORMAL
AST SERPL-CCNC: 16 IU/L (ref 0–40)
BACTERIA UR CULT: NORMAL
BILIRUB SERPL-MCNC: 0.5 MG/DL (ref 0–1.2)
BILIRUB UR QL STRIP: NEGATIVE
BUN SERPL-MCNC: 10 MG/DL (ref 10–36)
BUN/CREAT SERPL: 13 (ref 12–28)
CALCIUM SERPL-MCNC: 9.5 MG/DL (ref 8.7–10.3)
CHLORIDE SERPL-SCNC: 97 MMOL/L (ref 96–106)
CHOLEST SERPL-MCNC: 211 MG/DL (ref 100–199)
CO2 SERPL-SCNC: 20 MMOL/L (ref 18–29)
COLOR UR: YELLOW
CREAT SERPL-MCNC: 0.77 MG/DL (ref 0.57–1)
GLOBULIN SER CALC-MCNC: 3.7 G/DL (ref 1.5–4.5)
GLUCOSE SERPL-MCNC: 85 MG/DL (ref 65–99)
GLUCOSE UR QL: NEGATIVE
HDLC SERPL-MCNC: 61 MG/DL
HGB UR QL STRIP: NEGATIVE
INTERPRETATION, 910389: NORMAL
KETONES UR QL STRIP: NEGATIVE
LDLC SERPL CALC-MCNC: 130 MG/DL (ref 0–99)
LEUKOCYTE ESTERASE UR QL STRIP: NEGATIVE
MICRO URNS: ABNORMAL
NITRITE UR QL STRIP: NEGATIVE
PH UR STRIP: 7 [PH] (ref 5–7.5)
POTASSIUM SERPL-SCNC: 4.3 MMOL/L (ref 3.5–5.2)
PROT SERPL-MCNC: 7.6 G/DL (ref 6–8.5)
PROT UR QL STRIP: NEGATIVE
SODIUM SERPL-SCNC: 139 MMOL/L (ref 134–144)
SP GR UR: 1.02 (ref 1–1.03)
TRIGL SERPL-MCNC: 99 MG/DL (ref 0–149)
UROBILINOGEN UR STRIP-MCNC: 0.2 MG/DL (ref 0.2–1)
VLDLC SERPL CALC-MCNC: 20 MG/DL (ref 5–40)

## 2017-07-03 NOTE — PROGRESS NOTES
LDL was about same as a year ago, a little high. Increase fluid intake. Urine and blood work otherwise came back good.

## 2017-07-06 ENCOUNTER — TELEPHONE (OUTPATIENT)
Dept: FAMILY MEDICINE CLINIC | Facility: CLINIC | Age: 82
End: 2017-07-06

## 2017-07-06 NOTE — TELEPHONE ENCOUNTER
LDL was about same as a year ago, a little high.     Increase fluid intake. Urine and blood work otherwise came back good. Called pt and left message. Call back number left and I myself or one of the other nurses will attempt to contact again.     The call was to inform pt results

## 2017-07-11 ENCOUNTER — TELEPHONE (OUTPATIENT)
Dept: FAMILY MEDICINE CLINIC | Facility: CLINIC | Age: 82
End: 2017-07-11

## 2017-07-11 NOTE — TELEPHONE ENCOUNTER
Called pt and left message. Call back number left and I myself or one of the other nurses will attempt to contact again. The call was to clarify concerns.

## 2017-07-11 NOTE — TELEPHONE ENCOUNTER
Patient son called and stated pt has a 4 week follow appt with Orthopedics for broken humerus. Son is concerned because pt feet are now swelling and they don't know where it could be coming from.  Please give them a call back

## 2017-07-21 ENCOUNTER — PATIENT OUTREACH (OUTPATIENT)
Dept: FAMILY MEDICINE CLINIC | Facility: CLINIC | Age: 82
End: 2017-07-21

## 2017-07-21 NOTE — PROGRESS NOTES
This note will not be viewable in [address] Patient discharged from [facility] on 7/18/17-7/20/17 for  Right Thalamic Bleed,Hematoma. Patient discharged to Home with [address] [place], [place] [address]e and family support on 7/20/17. NN contact Patient/family on 7/21/17. Attempt to contact patient via telephone on 7/21/17 for post hospital follow up. Reached patient's son on phone Mr. Shaye Bellamy. Mr. Shaye Bellamy stated Bandar Faustin is okay. She is better now. My brother and his wife is not here at this time. \"  Mr. Barbara Villegas  Is not listed on patient's PHI. No patient medical information details given to Mr. Shaye Bellamy. Patient's son Mr. Tone Cordoba states that patients resides with his brother. She has strong family support from family. Patient can walk with assistance. Mr. Tone Cordoba asked writer to call back on Monday to talk to Mr. Arvin Taylor. Noted upcoming appointment on   7/28/2017 11:00 AM Eamon Parker MD     No concern, needs, questions and assistance at this time as per  Shaye Bellamy.

## 2017-07-24 ENCOUNTER — PATIENT OUTREACH (OUTPATIENT)
Dept: FAMILY MEDICINE CLINIC | Facility: CLINIC | Age: 82
End: 2017-07-24

## 2017-07-24 NOTE — PROGRESS NOTES
This note will not be viewable in [address] Patient discharged from [facility] on 7/18/17-7/20/17 for  Right Thalamic Bleed,Hematoma. Patient discharged to Home with Mikala Westfall Ana and family support on 7/20/17. NN contact Patient/family on 7/21/17 and 7/24/17   Patient admitted to [facility] -ED on 3/6/17-3/6/17 for Contusion of Occipital,  Post Fall. Attempt to contact patient via telephone on 7/24/17 for post hospital follow up. Reached patient's daughter in Law Ms. Eula Horne on the Phone. Ms. Eula Horne is listed under patient's PHI. Ms. Eula Horne verified patient's identity using full name and date of birth. Ms. Eula Horne states that the patient is okay. Patient's daughter in law denied patient's having chest pain, shortness of breath, fever, and chills. Patient's daughter in law denied patient's having headache , lightheadedness, dizziness, weakness, facial drooping, abdominal pain, swelling andabdominal distention. Patient daughter in law denied s/s of bleeding. Patient's daughter in law refused medication reconciliation. As per patient's daughter in law. Patient was seen by  Ramin Rutherford today. Patient daughter in law states that she is concern that her mother in law is constipated. No BM for a week as per MsJian Ebonytheodorebrealihsa Horne. Instructed patient's daughter in law to call patient's GI Dr. Ms. Eula Horne verbalized understanding. Appointment scheduled on   7/28/2017 11:00 AM Denae Adler MD       Educated patient's daughter in law  to monitor and report the following Red flags: Chest pain, shortness of breath, lightheadedness, dizziness, abdominal pain, abdominal swelling, worsening of symptoms or any new or concerning symptoms.  Advised patient's daughter in law to seek medical attention with patient provider, urgent care or return to the emergency department if any of the following symptoms  occur after being discharged from the hospital:  fever >101.5F,  chest pain, shortness of breath, leg swelling/pain, and/or return of the symptoms which initially resulted in hospitalization. Patient's daughter in law verbalized understanding of information discussed and is aware of  when to seek medical attention from PCP, urgent care or ED. Opportunity to ask questions was provided. Contact information was provided for future reference, assistance, concerns, or further questions.

## 2017-07-25 ENCOUNTER — PATIENT OUTREACH (OUTPATIENT)
Dept: FAMILY MEDICINE CLINIC | Facility: CLINIC | Age: 82
End: 2017-07-25

## 2017-07-25 NOTE — PROGRESS NOTES
This note will not be viewable in [address] Patient discharged from [facility] on 7/18/17-7/20/17 for  Right Thalamic Bleed,Hematoma. Patient discharged to Home with [address] [place] [address] Efrene and family support on 7/20/17. NN contact Patient/family on 7/21/17 and 7/24/17   Patient admitted to [facility] -ED on 3/6/17-3/6/17 for Contusion of Occipital,  Post Fall. Attempt to contact patient via telephone on 7/25/17 for post hospital follow up and to relay Dr. Macarena Scott message to patient. Unable to reach. Left message on voicemail with office contact information. The call is to also relay Dr. Macarena Scott message \" If Miralax is not working, patient can use Dulcolax suppository. \"    No Patient medical information details left on message.

## 2017-07-28 ENCOUNTER — PATIENT OUTREACH (OUTPATIENT)
Dept: FAMILY MEDICINE CLINIC | Facility: CLINIC | Age: 82
End: 2017-07-28

## 2017-07-28 ENCOUNTER — HOSPITAL ENCOUNTER (OUTPATIENT)
Dept: LAB | Age: 82
Discharge: HOME | End: 2017-07-28

## 2017-07-28 ENCOUNTER — OFFICE VISIT (OUTPATIENT)
Dept: FAMILY MEDICINE CLINIC | Facility: CLINIC | Age: 82
End: 2017-07-28

## 2017-07-28 VITALS
BODY MASS INDEX: 20.74 KG/M2 | WEIGHT: 124.5 LBS | SYSTOLIC BLOOD PRESSURE: 108 MMHG | TEMPERATURE: 98 F | HEIGHT: 65 IN | DIASTOLIC BLOOD PRESSURE: 60 MMHG | RESPIRATION RATE: 13 BRPM

## 2017-07-28 DIAGNOSIS — K21.9 GASTROESOPHAGEAL REFLUX DISEASE WITHOUT ESOPHAGITIS: ICD-10-CM

## 2017-07-28 DIAGNOSIS — D64.9 ANEMIA, UNSPECIFIED TYPE: ICD-10-CM

## 2017-07-28 DIAGNOSIS — K59.00 CONSTIPATION, UNSPECIFIED CONSTIPATION TYPE: ICD-10-CM

## 2017-07-28 DIAGNOSIS — S06.340A: Primary | ICD-10-CM

## 2017-07-28 PROCEDURE — 99001 SPECIMEN HANDLING PT-LAB: CPT | Performed by: FAMILY MEDICINE

## 2017-07-28 NOTE — PROGRESS NOTES
SUBJECTIVE    Chief Complaint   Patient presents with    Follow-up    Fall     HPI:     Patient was admitted to hospital 7/18/17-7/20/17 for Right Thalamic Bleed/ Hematoma from fall. Patient was discharged to Home with Home Health and family support on 7/20/17. NN contact Patient/family on 7/21/17 to 7/25/17 for the transitional care. Her repeated CT head was stable. Neurosurgery felt intervention was not needed and  signed off. She has been unsteady and unable to stand since the injury. She was discharged to her family with home PT/OT and [facility] referral.  She is gradually improving since coming home. Her GERD is not better on ranitidine and son does not want to go back on Prilosec, which was helping. Her constipation is being treated with Colace and PRN Miralax. Her appetite is better. She has R fifth finger fracture and Lt humerus Fx, splint in place. Has follow up with oro outpatient     Her H and H dropped initially; but stabilized before discharge    Code status: discussed in details with son, He want all aggressive and heroic measures . Full code     Past Medical History:   Diagnosis Date    Back pain     DJD (degenerative joint disease)     mostly of knees    DM (diabetes mellitus) (HCC)     Fatigue     GERD (gastroesophageal reflux disease)     w/chronic pulmonary fibrosis    Herpes zoster     Lumbar spondylosis     LBP    Motion sickness     chronic    PVCs (premature ventricular contractions)     recurrent    Varicose veins     Venous insufficiency     Lt LE     No past surgical history on file. Current Outpatient Prescriptions   Medication Sig    ACETAMINOPHEN (TYLENOL PO) Take 1 Tab by mouth daily as needed.  meclizine (ANTIVERT) 25 mg tablet Take  by mouth three (3) times daily as needed.  ginkgo biloba 120 mg tab Take  by mouth.  vitamin e (E GEMS) 100 unit capsule Take  by mouth daily.     Ca-D3-mag-zinc--kristina-boron (CALTRATE 600+D PLUS MINERALS) 600 mg calcium- 800 unit-40 mg chew Take 1 Tab by mouth two (2) times a day.  polyethylene glycol (MIRALAX) 17 gram/dose powder Take 17 g by mouth daily as needed.  FERROUS FUMARATE/VIT BCOMP&C (SUPER B COMPLEX PO) Take 1 tablet by mouth daily.  GLUCOSAMINE/CHONDROITN/NA/C/SE (JOINT FORMULA PO) Take  by mouth. Two capsules in the morning and two capsules in the evening.  Calcium Carbonate-Vit D3-Min 600 mg calcium- 400 unit tab Take  by mouth.  doxepin (SINEQUAN) 10 mg/mL solution Take 0.5-1 mL by mouth nightly.  raNITIdine (ZANTAC) 150 mg tablet Take 1 Tab by mouth two (2) times a day.  coenzyme q10-vitamin e (COQ10 ) 100-100 mg-unit cap Take  by mouth. Two capsules in the morning and two capsules in the evening daily. No current facility-administered medications for this visit. Allergies: Iodine    ROS:     Constitutional: No fever, chills, night sweats, malaise. Cardiovascular: No angina, palpitations, PND, orthopnea, lightheadedness, edema, claudication. Respiratory: No pleurisy, hemoptysis, unusual sputum. Gastrointestinal: no pain, melena, hematochezia. Psychiatric: No agitation, suicidal or homicidal ideation. Musculoskeletal: chronic back problem and gait difficulty. DJD of joints. OBJECTIVE  Constitutional: General Appearance:  well developed, well nourished, nontoxic, in no acute distress. Visit Vitals    /60 (BP 1 Location: Right arm, BP Patient Position: Sitting)    Temp 98 °F (36.7 °C) (Oral)    Resp 13    Ht 5' 5\" (1.651 m)    Wt 124 lb 8 oz (56.5 kg)    BMI 20.72 kg/m2     HENT: Nose: Nose normal.   Mouth/Throat: Lips normal.   Minor laceration on the forehead with sutures. Pulmonary: Respiratory effort: normal; no dyspnea, no retractions, no accessory muscle use. Auscultation: normal & symmetrical air exchange; coarse rales, most prominent at Rt lower area; no rhonchi; no wheeze; no rubs. Cardiovascular:  Auscultation: RRR; no murmur, rubs; S4 gallop unchanged. Extremities: no edema. GI[de-identified] Normal bowel sounds. No masses; no tenderness; no rebound/rigidity; no CVA tenderness. No hepatosplenomegaly. Bladder: no fullnesses, tenderness or palpable masses. Psychiatric: Oriented to time, place and person. Musculoskeletal:   Lt arm in cast , Rt 5th finger is immobilized   On wheel chair. Psychiatric: Mood/affect normal.     Lab Results   Component Value Date/Time    WBC 7.1 05/26/2017 03:30 PM    HGB 12.8 05/26/2017 03:30 PM    HCT 39.8 05/26/2017 03:30 PM    PLATELET 99 38/15/3116 03:30 PM    MCV 87.7 05/26/2017 03:30 PM     Lab Results   Component Value Date/Time    Sodium 139 06/30/2017 11:47 AM    Potassium 4.3 06/30/2017 11:47 AM    Chloride 97 06/30/2017 11:47 AM    CO2 20 06/30/2017 11:47 AM    Anion gap 9 12/07/2016 02:52 PM    Glucose 85 06/30/2017 11:47 AM    BUN 10 06/30/2017 11:47 AM    Creatinine 0.77 06/30/2017 11:47 AM    BUN/Creatinine ratio 13 06/30/2017 11:47 AM    GFR est AA 78 06/30/2017 11:47 AM    GFR est non-AA 67 06/30/2017 11:47 AM    Calcium 9.5 06/30/2017 11:47 AM    Bilirubin, total 0.5 06/30/2017 11:47 AM    AST (SGOT) 16 06/30/2017 11:47 AM    Alk. phosphatase 55 06/30/2017 11:47 AM    Protein, total 7.6 06/30/2017 11:47 AM    Albumin 3.9 06/30/2017 11:47 AM    Globulin 4.3 12/07/2016 02:52 PM    A-G Ratio 1.1 06/30/2017 11:47 AM    ALT (SGPT) 7 06/30/2017 11:47 AM     Lab Results   Component Value Date/Time    Hemoglobin A1c 5.8 03/15/2017 01:45 AM     Lab Results   Component Value Date/Time    TSH 4.000 03/15/2017 01:45 AM     CT head 07/18/17   Stable hematoma adjacent the right thalamus which appears to be in the right lateral ventricle choroid measuring 8 mm. Similar amount of intraventricular hemorrhage in the posterior horn of the right lateral ventricle. No midline shift. No hydrocephalus.      Xray Left humerus  Persistent transverse offset of a mildly comminuted fracture of the proximal left humeral diaphysis      Urinalysis (07/18/2017 1:47 PM)  Urinalysis (07/18/2017 1:47 PM)   Component Value Ref Range   Urine pH 6.0 5.0 - 8.0 pH   Urine Protein Screen Negative Negative, Trace mg/dL   Urine Glucose Negative Negative mg/dL   Urine Ketones Negative Negative mg/dL   Urine Occult Blood Negative Negative   Urine Specific Gravity 1.012 1.005 - 1.030   Urine Nitrite Negative Negative   Urine Leukocyte Esterase Negative Negative   Urine Bilirubin Negative Negative   Urine Urobilinogen <2.0 <2.0 mg/dL     HEPATIC FUNCTION PANEL (07/18/2017 3:10 AM)  HEPATIC FUNCTION PANEL (07/18/2017 3:10 AM)   Component Value Ref Range   ALBUMIN 3.6 3.5 - 5.0 g/dL   TOTAL PROTEIN 6.7 6.2 - 8.1 g/dL   GLOBULIN SERUM 3.1 2.0 - 4.0 g/dL   A/G RATIO 1.2 1.1 - 2.6 ratio   BILIRUBIN TOTAL 0.6 0.2 - 1.2 mg/dL   BILIRUBIN DIRECT <0.2 0.0 - 0.3 mg/dL   SGOT (AST) 17 10 - 37 U/L   ALKALINE PHOSPHATASE 59 40 - 120 U/L   SGPT (ALT) 11 5 - 40 U/L     BASIC METABOLIC PANEL (35/27/4602 3:10 AM)  BASIC METABOLIC PANEL (84/11/4147 3:10 AM)   Component Value Ref Range   POTASSIUM 4.1 3.5 - 5.5 mmol/L   SODIUM 138 133 - 145 mmol/L   CHLORIDE 100 98 - 110 mmol/L   GLUCOSE 123 (H) 65 - 99 mg/dL   CALCIUM 9.2 8.4 - 10.5 mg/dL   BUN 13 6 - 22 mg/dL   CREATININE 0.7 (L) 0.8 - 1.4 mg/dL   CO2 24 20 - 32 mmol/L   eGFR African American >60.0 >60.0   eGFR Non African American >60.0 >60.0   ANION GAP 13.8 mmol/L     CBC Stat (07/20/2017 8:44 AM)  CBC Stat (07/20/2017 8:44 AM)   Component Value Ref Range   WBC x 10*3 10.5 4.0 - 11.0 K/uL   RBC x 10^6 3.33 (L) 3.80 - 5.20 M/uL   HGB 9.3 (L) 11.7 - 16.1 g/dL   HCT 29.0 (L) 35.1 - 48.3 %   MCV 87 80 - 95 fL   MCH 28 26 - 34 pg   MCHC 32 32 - 36 g/dL   RDW 14.4 10.0 - 16.0 %   Platelet 713 991 - 836 K/uL   MPV 11.9 (H) 6.0 - 10.8 fL         ASSESSMENT    1. Traumatic intracerebral hemorrhage without loss of consciousness, right, initial encounter ([facility] [address]    2.  Gastroesophageal reflux disease without esophagitis    3. Anemia, from acute blood loss    4. Constipation, unspecified constipation type        PLAN    Orders Placed This Encounter    CT HEAD WO CONT    CBC WITH AUTOMATED DIFF    URINALYSIS W/ RFLX MICROSCOPIC    REFERRAL TO GASTROENTEROLOGY    ACETAMINOPHEN (TYLENOL PO)     Pharmacologic Management: Medications reviewed with the son. He declines any adjustment. Son will take her to ER for suture removal.     Discussed DDx, follow-up & work-up. Discussed risk/benefit/side effect. Avoid fall and injury. Increase fluid intake. Follow up in as scheduled, prn sooner. PRN to ER. Health risk from non adherence discussed. Patient/ son daughter voiced understanding. Follow-up Disposition:  Return in about 1 month (around 8/28/2017).     Sari Pulido MD

## 2017-07-28 NOTE — MR AVS SNAPSHOT
Visit Information Date & Time Provider Department Dept. Phone Encounter #  
 7/28/2017 11:00 AM Patric Mello MD [facility] [phone] [id number] Your Appointments 8/25/2017  2:30 PM  
Follow Up with Patric Mello MD  
[facility] ([address]) Appt Note: 1 month follow up appointment [address] [phone]  
[address] [place] [address] Upcoming Health Maintenance Date Due  
 GLAUCOMA SCREENING Q2Y 8/31/2017* EYE EXAM RETINAL OR DILATED Q1 8/31/2017* INFLUENZA AGE 9 TO ADULT 8/1/2017 HEMOGLOBIN A1C Q6M 9/15/2017 Pneumococcal 65+ Low/Medium Risk (2 of 2 - PPSV23) 3/14/2018 FOOT EXAM Q1 3/14/2018 MEDICARE YEARLY EXAM 3/15/2018 MICROALBUMIN Q1 3/15/2018 LIPID PANEL Q1 6/30/2018 DTaP/Tdap/Td series (2 - Td) 3/14/2027 *Topic was postponed. The date shown is not the original due date. Allergies as of 7/28/2017  Review Complete On: 7/28/2017 By: Patric Mello MD  
  
 Severity Noted Reaction Type Reactions Shellfish Derived High 01/06/2015    Anaphylaxis Salonpas-hot [Capsaicin] Medium 08/28/2015   Topical Rash Iodine    Not Reported This Time Oxycodone-acetaminophen  07/18/2017    Other (comments) Son states, \"it makes her crazy\" Current Immunizations  Reviewed on 2/3/2017 No immunizations on file. Not reviewed this visit You Were Diagnosed With   
  
 Codes Comments Gastroesophageal reflux disease without esophagitis    -  Primary ICD-10-CM: K21.9 ICD-9-CM: 530.81 Constipation, unspecified constipation type     ICD-10-CM: K59.00 ICD-9-CM: 564.00 Traumatic intracerebral hemorrhage without loss of consciousness, right, initial encounter ([email or web address]     ICD-10-CM: I83.314V 
ICD-9-CM: 853.01 Vitals BP Temp Resp Height(growth percentile) Weight(growth percentile) BMI 108/60 (BP 1 Location: Right arm, BP Patient Position: Sitting) 98 °F (36.7 °C) (Oral) 13 5' 5\" (1.651 m) 124 lb 8 oz (56.5 kg) 20.72 kg/m2 OB Status Smoking Status Postmenopausal Never Smoker Vitals History BMI and BSA Data Body Mass Index Body Surface Area 20.72 kg/m 2 1.61 m 2 Preferred Pharmacy Pharmacy Name Phone [facility] [address] [facility],  Arian Jimenes Your Updated Medication List  
  
   
This list is accurate as of: 7/28/17 12:00 PM.  Always use your most recent med list.  
  
  
  
  
 Ca-D3-mag-zinc--kristina-boron 600 mg calcium- 800 unit-40 mg Chew Commonly known as:  CALTRATE 600+D PLUS MINERALS Take 1 Tab by mouth two (2) times a day. ginkgo biloba 120 mg Tab Take  by mouth. JOINT FORMULA PO Take  by mouth. Two capsules in the morning and two capsules in the evening. meclizine 25 mg tablet Commonly known as:  ANTIVERT Take  by mouth three (3) times daily as needed. polyethylene glycol 17 gram/dose powder Commonly known as:  Manual Rouleau Take 17 g by mouth daily as needed. TYLENOL PO Take 1 Tab by mouth daily as needed. vitamin e 100 unit capsule Commonly known as:  E GEMS Take  by mouth daily. We Performed the Following REFERRAL TO GASTROENTEROLOGY [UNS71 Custom] Comments:  
 Please evaluate patient for GERD and constipation To-Do List   
 07/28/2017 Lab:  URINALYSIS W/ RFLX MICROSCOPIC   
  
 07/31/2017 Imaging:  CT HEAD WO CONT Referral Information Referral ID Referred By Referred To  
  
 [id number] Delta Seip, MD [address]   
   [address] [employer], [address] [place] [address] Phone: [phone] Fax: [phone] Visits Status Start Date End Date 1 New Request 7/28/17 7/28/18 If your referral has a status of pending review or denied, additional information will be sent to support the outcome of this decision. Referral ID Referred By Referred To  
 [id number] Cristobal Ocasio Not Available Visits Status Start Date End Date 1 New Request 7/28/17 7/28/18 If your referral has a status of pending review or denied, additional information will be sent to support the outcome of this decision. Introducing [facility]! Lilli Butler introduces [employer] patient portal. Now you can access parts of your medical record, email your doctor's office, and request medication refills online. 1. In your internet browser, go to [email or web address] 2. Click on the First Time User? Click Here link in the Sign In box. You will see the New Member Sign Up page. 3. Enter your Sit[employer]ape Access Code exactly as it appears below. You will not need to use this code after youve completed the sign-up process. If you do not sign up before the expiration date, you must request a new code. · Sit[employer]ape Access Code: [email or web address] Expires: 9/28/2017 10:19 AM 
 
4. Enter the last four digits of your Social Security Number (xxxx) and Date of Birth (mm/dd/yyyy) as indicated and click Submit. You will be taken to the next sign-up page. 5. Create a SitScape ID. This will be your SitScape login ID and cannot be changed, so think of one that is secure and easy to remember. 6. Create a SitScape password. You can change your password at any time. 7. Enter your Password Reset Question and Answer. This can be used at a later time if you forget your password. 8. Enter your e-mail address. You will receive e-mail notification when new information is available in [address] 9. Click Sign Up. You can now view and download portions of your medical record. 10. Click the Download Summary menu link to download a portable copy of your medical information. If you have questions, please visit the Frequently Asked Questions section of the [employer] website. Remember, Thubrikar Aortic Valve is NOT to be used for urgent needs. For medical emergencies, dial 911. Now available from your iPhone and Android! Please provide this summary of care documentation to your next provider. Your primary care clinician is listed as [address] [place] [address]. If you have any questions after today's visit, please call [phone].

## 2017-07-28 NOTE — PROGRESS NOTES
Chema Tovar is a 80 y.o.  female presents today for office visit for follow up . Pt is in Room # 4. This patient is accompanied in the office by her son, Suman Toledo. 1. Have you been to the ER, urgent care clinic since your last visit? Hospitalized since your last visit? Yes [facility]  for falls    2. Have you seen or consulted any other health care providers outside of the [address] [place] [address] since your last visit? Include any pap smears or colon screening.  Yes, Home Health  - , PT 7/2017      Upcoming Appts  8/7/17 - Dr. Chano Collins, Orthopedic

## 2017-07-28 NOTE — PROGRESS NOTES
This note will not be viewable in [address] Patient discharged from [facility] on 7/18/17-7/20/17 for  Right Thalamic Bleed,Hematoma. Patient discharged to Home with [address] [place], [place] [address] and family support on 7/20/17. NN contact Patient/family on 7/21/17 and 7/24/17   Patient admitted to [facility]ED on 3/6/17-3/6/17 for Contusion of Occipital,  Post Fall. I met the patient and patient's son in  the office today 7/28/17. Patient's son states that She is doing good. No S/S of acute distress noted on patient at this time. As per patient's son. Home Health nurse, PT OT and ST following patient. Patient's son states that patient is now taking Miralax and colace for constipation. Per patient's son, patient's BP has been running good. No concerns, needs, assistance and/or questions verbalized by patient's son  at this time. Office contact information was provided for future reference, assistance, concerns, or further questions.

## 2017-07-29 LAB
BASOPHILS # BLD AUTO: 0.1 X10E3/UL (ref 0–0.2)
BASOPHILS NFR BLD AUTO: 1 %
EOSINOPHIL # BLD AUTO: 0.1 X10E3/UL (ref 0–0.4)
EOSINOPHIL NFR BLD AUTO: 2 %
ERYTHROCYTE [DISTWIDTH] IN BLOOD BY AUTOMATED COUNT: 16.2 % (ref 12.3–15.4)
HCT VFR BLD AUTO: 27.8 % (ref 34–46.6)
HGB BLD-MCNC: 8.8 G/DL (ref 11.1–15.9)
IMM GRANULOCYTES # BLD: 0 X10E3/UL (ref 0–0.1)
IMM GRANULOCYTES NFR BLD: 0 %
LYMPHOCYTES # BLD AUTO: 1.5 X10E3/UL (ref 0.7–3.1)
LYMPHOCYTES NFR BLD AUTO: 17 %
MCH RBC QN AUTO: 27.8 PG (ref 26.6–33)
MCHC RBC AUTO-ENTMCNC: 31.7 G/DL (ref 31.5–35.7)
MCV RBC AUTO: 88 FL (ref 79–97)
MONOCYTES # BLD AUTO: 0.7 X10E3/UL (ref 0.1–0.9)
MONOCYTES NFR BLD AUTO: 8 %
NEUTROPHILS # BLD AUTO: 6.4 X10E3/UL (ref 1.4–7)
NEUTROPHILS NFR BLD AUTO: 72 %
PLATELET # BLD AUTO: 307 X10E3/UL (ref 150–379)
RBC # BLD AUTO: 3.16 X10E6/UL (ref 3.77–5.28)
WBC # BLD AUTO: 8.9 X10E3/UL (ref 3.4–10.8)

## 2017-07-29 RX ORDER — ERGOCALCIFEROL (VITAMIN D2) 10 MCG
1 TABLET ORAL
Qty: 100 TAB | Refills: 0 | Status: SHIPPED | OUTPATIENT
Start: 2017-07-29 | End: 2017-08-08

## 2017-07-29 NOTE — PROGRESS NOTES
Blood count is still low. Will treat with ferrous fumarate 89 mg tid, if tolerated. Patient's family contacted (D-in-law, who comes with her). I was informed that she was admitted today in hospital.  She voiced understanding.

## 2017-08-02 ENCOUNTER — PATIENT OUTREACH (OUTPATIENT)
Dept: FAMILY MEDICINE CLINIC | Facility: CLINIC | Age: 82
End: 2017-08-02

## 2017-08-02 NOTE — PROGRESS NOTES
Patient admitted to [facility] on 7/29/17-8/1/17 for UTI . Patient discharged to home with Mikala Perez on 8/1/17. NN contact Patient on 8/2/17    Patient has a history of Medical History:       Past Medical History:   Diagnosis Date    Back pain     Comminuted left humeral fracture 07/2017    DJD (degenerative joint disease)     mostly of knees    DM (diabetes mellitus) (HCC)     Fatigue     GERD (gastroesophageal reflux disease)     w/chronic pulmonary fibrosis    Herpes zoster     Lumbar spondylosis     LBP    Motion sickness     chronic    PVCs (premature ventricular contractions)     recurrent    Varicose veins     Venous insufficiency     Lt LE     This represents Transitions of Care b/c NN spoke with patient and/or caregiver within 1 business day of discharge. Pt's TCM follow up appt is scheduled with Dr. Vladimir Gonzalez on Tuesday August 08, 2017  @ 3 pm which is within 7  calendar days of discharge. Contacted patient for hospital follow up. Reached patient's son Mr. Laura Willson on phone. Patient's son is listed on Pt.'s PHI. Introduced self, role and reason for call. Verified 2 patient identifiers (Name and Date of Birth). Patient's son reported:  Patient's son stated \" She is doing okay. \"    Patient's son denied:  Patient's son denied patient's having chest pain, shortness of breath, lightheadedness, dizziness, fever, chills, nausea, vomiting, flank pain and bleeding. Medication Reconciliation completed: yes. Patient reported the following on the patients ADL's:  Feeds self: independent as per patient's son   Ambulates: with assistance as per patient's son   Self grooming: with assistance as per patient's son   Toileting: with assistance as per patient's son   Support System consists of: Patient's family and Home Health Staff    Appointments:  Tuesday, August 08, 2017 03:00 PM with Dr. Vladimir Gonzalez. Patient's son  aware of appointments.  Patient's son will provide transportation. Previous Use of Home Health Agency, if so what agency? yes , [facility]. DME  Walker and wheel chair    Advance Medical Directive on file in EMR? No,  None noted. Barriers to care  No barriers to care identified at this time. Adherence to previous treatment and likelihood for follow-up:  Patient's son verbalized understanding of discharge instructions and special follow up. Educated patient's son to monitor and report the following Red flags: Chest Pain shortness of breath, fever, chills, lightheadedness, dizziness , flank pain, nausea and vomiting, bleeding or any new or concerning symptoms. Advised patient's son to seek medical attention with patient provider, urgent care or return to the emergency department if any of the following symptoms  occur after being discharged from the hospital:  fever >101.5F,  chest pain, shortness of breath, leg swelling/pain, and/or return of the symptoms which initially resulted in hospitalization. Patient's son verbalized understanding of information discussed and is aware of  when to seek medical attention from PCP, urgent care or ED. Reconciled home medications and reviewed allergies. Instructed to bring all medications with patient to next appointment. Opportunity to ask questions was provided. Contact information was provided for future reference, assistance, concerns, or further questions. No further concerns, assistance, needs and/or questions at this time as per patient's son.      Plan of Care/Goals:    Patient will attend follow up appointment  Patient will be free from post hospital complications    As of this time, Noted no D/C Summary available. Patient's son states that patient will be taking her antibiotic today. Patient's son states that home health nurse is scheduled to come in today. Patient's son verbalized concern about patient's Right Hip Hematoma.  Patient's son denied right hip redness, swelling, warmth and pain. No discoloration and coldness sensation to right leg as per patient's son. Advised patient's son to have [address] check Right Hip Hematoma. Patient's son verbalized understanding. Offer sooner appointment, patient's son refused and states that he will call the office PRN for sooner appointment. No further questions, concern, needs, and/or assistance at this time as per patient' s son.

## 2017-08-08 ENCOUNTER — PATIENT OUTREACH (OUTPATIENT)
Dept: FAMILY MEDICINE CLINIC | Facility: CLINIC | Age: 82
End: 2017-08-08

## 2017-08-08 ENCOUNTER — OFFICE VISIT (OUTPATIENT)
Dept: FAMILY MEDICINE CLINIC | Facility: CLINIC | Age: 82
End: 2017-08-08

## 2017-08-08 VITALS
RESPIRATION RATE: 14 BRPM | DIASTOLIC BLOOD PRESSURE: 64 MMHG | BODY MASS INDEX: 20.66 KG/M2 | HEART RATE: 82 BPM | OXYGEN SATURATION: 94 % | HEIGHT: 65 IN | SYSTOLIC BLOOD PRESSURE: 120 MMHG | TEMPERATURE: 98.6 F | WEIGHT: 124 LBS

## 2017-08-08 DIAGNOSIS — S06.300D: ICD-10-CM

## 2017-08-08 DIAGNOSIS — D50.9 IRON DEFICIENCY ANEMIA, UNSPECIFIED IRON DEFICIENCY ANEMIA TYPE: ICD-10-CM

## 2017-08-08 DIAGNOSIS — K21.9 GASTROESOPHAGEAL REFLUX DISEASE WITHOUT ESOPHAGITIS: ICD-10-CM

## 2017-08-08 DIAGNOSIS — N10 ACUTE PYELONEPHRITIS: Primary | ICD-10-CM

## 2017-08-08 PROBLEM — S06.30AA INTRACRANIAL HEMORRHAGE FOLLOWING INJURY: Status: ACTIVE | Noted: 2017-08-08

## 2017-08-08 LAB
BILIRUB UR QL STRIP: NEGATIVE
GLUCOSE UR-MCNC: NEGATIVE MG/DL
KETONES P FAST UR STRIP-MCNC: NEGATIVE MG/DL
PH UR STRIP: 7 [PH] (ref 4.6–8)
PROT UR QL STRIP: NEGATIVE MG/DL
SP GR UR STRIP: 1.01 (ref 1–1.03)
UA UROBILINOGEN AMB POC: NORMAL (ref 0.2–1)
URINALYSIS CLARITY POC: CLEAR
URINALYSIS COLOR POC: YELLOW
URINE BLOOD POC: NEGATIVE
URINE LEUKOCYTES POC: NORMAL
URINE NITRITES POC: NEGATIVE

## 2017-08-08 RX ORDER — FERROUS SULFATE 300 MG/5ML
3 LIQUID (ML) ORAL
Qty: 250 ML | Refills: 1 | Status: SHIPPED | OUTPATIENT
Start: 2017-08-08 | End: 2018-01-15

## 2017-08-08 RX ORDER — BISMUTH SUBSALICYLATE 262 MG
1 TABLET,CHEWABLE ORAL DAILY
COMMUNITY

## 2017-08-08 RX ORDER — CEPHALEXIN 500 MG/1
CAPSULE ORAL
COMMUNITY
Start: 2017-08-01 | End: 2017-08-08

## 2017-08-08 RX ORDER — RANITIDINE 150 MG/1
150 TABLET, FILM COATED ORAL 2 TIMES DAILY
Qty: 60 TAB | Refills: 1 | Status: SHIPPED | OUTPATIENT
Start: 2017-08-08 | End: 2018-03-12

## 2017-08-08 NOTE — PROGRESS NOTES
SUBJECTIVE    Chief Complaint   Patient presents with   [facility] on 7/29/17-8/1/17 for UTI      HPI:     Patient was admitted to hospital 7/29 -8/1/17 for UTI. Patient was discharged to Home with Home Health and family support. NN contact Patient/family on 8/2/17 for the transitional care. She has been feeling better since discharge from hospital.  She has finished taking antibiotics. No chills fever, N&V. No urinary symptoms. She has large hematoma on her Rt lateral upper thigh since injury on 7/18/17. It is not tender. She has had blood loss anemia since her injury. Her GERD was better on ranitidine in hospital and son wants her on it. Her constipation is being treated with Colace and PRN Miralax. She has R fifth finger fracture is better. Lt humerus Fx is also healing with splint in place. Has follow up with orhtopedist     Past Medical History:   Diagnosis Date    Back pain     Comminuted left humeral fracture 07/2017    DJD (degenerative joint disease)     mostly of knees    DM (diabetes mellitus) ([facility])     Fatigue     GERD (gastroesophageal reflux disease)     w/chronic pulmonary fibrosis    Herpes zoster     Lumbar spondylosis     LBP    Motion sickness     chronic    PVCs (premature ventricular contractions)     recurrent    Varicose veins     Venous insufficiency     Lt LE     No past surgical history on file. Current Outpatient Prescriptions   Medication Sig    multivitamin (ONE A DAY) tablet Take 1 Tab by mouth daily.  DOCOSAHEXANOIC ACID/EPA (FISH OIL PO) Take 1 Cap by mouth daily.  ACETAMINOPHEN (TYLENOL PO) Take 1 Tab by mouth daily as needed.  ginkgo biloba 120 mg tab Take  by mouth.  vitamin e (E GEMS) 100 unit capsule Take  by mouth daily.  Ca-D3-mag-zinc--kristina-boron (CALTRATE 600+D PLUS MINERALS) 600 mg calcium- 800 unit-40 mg chew Take 1 Tab by mouth two (2) times a day.     polyethylene glycol (MIRALAX) 17 gram/dose powder Take 17 g by mouth daily as needed.  GLUCOSAMINE/CHONDROITN/NA/C/SE (JOINT FORMULA PO) Take  by mouth. Two capsules in the morning and two capsules in the evening. No current facility-administered medications for this visit. Allergies: Iodine    ROS:   Constitutional: No fever, chills, night sweats, malaise. Cardiovascular: No angina, palpitations, PND, orthopnea, lightheadedness, edema, claudication. Respiratory: No pleurisy, hemoptysis, unusual sputum. Gastrointestinal: no pain, melena, hematochezia. Psychiatric: No agitation, suicidal or homicidal ideation. Musculoskeletal: chronic back problem and gait difficulty. DJD of joints. Genitourinary: No dysuria, urgency, frequency. .      OBJECTIVE  Constitutional: General Appearance:  well developed, well nourished, nontoxic, in no acute distress. Visit Vitals    /64 (BP 1 Location: Right arm, BP Patient Position: Sitting)    Pulse 82    Temp 98.6 °F (37 °C) (Oral)    Resp 14    Ht 5' 5\" (1.651 m)    Wt 124 lb (56.2 kg)    SpO2 94%    BMI 20.63 kg/m2       Pulmonary: Respiratory effort: normal; no dyspnea, no retractions, no accessory muscle use. Auscultation: normal & symmetrical air exchange; coarse rales, most prominent at Rt lower area; no rhonchi; no wheeze; no rubs. Cardiovascular: Auscultation: RRR; no murmur, rubs; S4 gallop unchanged. Extremities: no edema. GI[de-identified] Normal bowel sounds. No masses; no tenderness; no rebound/rigidity; no CVA tenderness. No hepatosplenomegaly. Bladder: no fullnesses, tenderness or palpable masses. Psychiatric: Oriented to time, place and person. Musculoskeletal:   NC/AT, neck supple. On wheel chair. Rt Lateral thigh near trochanteric area ha s 12 cm hematoma. No induration, heat, redness, tenderness. Psychiatric: Mood/affect normal.     Xray Rt Hip 7/17/17: No acute osseous finding.     Lab Results   Component Value Date/Time    WBC 8.9 07/28/2017 11:55 AM    HGB 8.8 07/28/2017 11:55 AM    HCT 27.8 07/28/2017 11:55 AM    PLATELET 258 06/70/2174 11:55 AM    MCV 88 07/28/2017 11:55 AM     Lab Results   Component Value Date/Time    Sodium 139 06/30/2017 11:47 AM    Potassium 4.3 06/30/2017 11:47 AM    Chloride 97 06/30/2017 11:47 AM    CO2 20 06/30/2017 11:47 AM    Anion gap 9 12/07/2016 02:52 PM    Glucose 85 06/30/2017 11:47 AM    BUN 10 06/30/2017 11:47 AM    Creatinine 0.77 06/30/2017 11:47 AM    BUN/Creatinine ratio 13 06/30/2017 11:47 AM    GFR est AA 78 06/30/2017 11:47 AM    GFR est non-AA 67 06/30/2017 11:47 AM    Calcium 9.5 06/30/2017 11:47 AM    Bilirubin, total 0.5 06/30/2017 11:47 AM    AST (SGOT) 16 06/30/2017 11:47 AM    Alk.  phosphatase 55 06/30/2017 11:47 AM    Protein, total 7.6 06/30/2017 11:47 AM    Albumin 3.9 06/30/2017 11:47 AM    Globulin 4.3 12/07/2016 02:52 PM    A-G Ratio 1.1 06/30/2017 11:47 AM    ALT (SGPT) 7 06/30/2017 11:47 AM     Lab Results   Component Value Date/Time    Hemoglobin A1c 5.8 03/15/2017 01:45 AM     Lab Results   Component Value Date/Time    TSH 4.000 03/15/2017 01:45 AM       Basic Metabolic Panel (BMP) (60/16/1298 3:50 AM)  Basic Metabolic Panel (BMP) (45/34/6107 3:50 AM)   Component Value Ref Range   POTASSIUM 3.9 3.5 - 5.5 mmol/L   SODIUM 134 133 - 145 mmol/L   CHLORIDE 99 98 - 110 mmol/L   GLUCOSE 95 65 - 99 mg/dL   CALCIUM 9.1 8.4 - 10.5 mg/dL   BUN 11 6 - 22 mg/dL   CREATININE 0.7 (L) 0.8 - 1.4 mg/dL   CO2 26 20 - 32 mmol/L   eGFR African American >60.0 >60.0   eGFR Non African American >60.0 >60.0   ANION GAP 8.7 mmol/L         HEPATIC FUNCTION PANEL (07/18/2017 3:10 AM)  HEPATIC FUNCTION PANEL (07/18/2017 3:10 AM)   Component Value Ref Range   ALBUMIN 3.6 3.5 - 5.0 g/dL   TOTAL PROTEIN 6.7 6.2 - 8.1 g/dL   GLOBULIN SERUM 3.1 2.0 - 4.0 g/dL   A/G RATIO 1.2 1.1 - 2.6 ratio   BILIRUBIN TOTAL 0.6 0.2 - 1.2 mg/dL   BILIRUBIN DIRECT <0.2 0.0 - 0.3 mg/dL   SGOT (AST) 17 10 - 37 U/L   ALKALINE PHOSPHATASE 59 40 - 120 U/L   SGPT (ALT) 11 5 - 40 U/L     CBC routine tomorrow (08/01/2017 3:50 AM)  CBC routine tomorrow (08/01/2017 3:50 AM)   Component Value Ref Range   WBC x 10*3 9.5 4.0 - 11.0 K/uL   RBC x 10^6 3.00 (L) 3.80 - 5.20 M/uL   HGB 8.6 (L) 11.7 - 16.1 g/dL   HCT 26.6 (L) 35.1 - 48.3 %   MCV 89 80 - 95 fL   MCH 29 26 - 34 pg   MCHC 32 32 - 36 g/dL   RDW 15.5 10.0 - 16.0 %   Platelet 964 740 - 659 K/uL   MPV 10.9 (H) 6.0 - 10.8 fL     CT Head 7/29/17:  1.  No acute interval changes. 2.  Resolving hemorrhage along the margin of the right thalamus with resolving intraventricular hemorrhage.  Resolved minimal superficial brain hemorrhage along the superior margin of the right frontal lobe.  No new hemorrhages. 3.  Stable background of atrophy with mild ventriculomegaly and diffuse likely chronic ischemic pattern white matter abnormality. ASSESSMENT    1. Acute pyelonephritis    2. Iron deficiency anemia, unspecified iron deficiency anemia type    3. Gastroesophageal reflux disease without esophagitis    4. Intracranial hemorrhage following injury, without LOC, subsequent encounter        PLAN    Orders Placed This Encounter    AMB POC URINALYSIS DIP STICK AUTO W/O MICRO    multivitamin (ONE A DAY) tablet    DOCOSAHEXANOIC ACID/EPA (FISH OIL PO)    ferrous sulfate 300 mg (60 mg iron)/5 mL syrup    raNITIdine (ZANTAC) 150 mg tablet     Pharmacologic Management: Medications reviewed with the son. Zantac 150 bid. FeSO4 180 mg with Vitamin C 125 mg tid. Discussed DDx, follow-up & work-up. Discussed risk/benefit/side effect. Avoid fall and injury. Increase fluid intake. Follow up in as scheduled, prn sooner. PRN to ER. Health risk from non adherence discussed. Patient/ son voiced understanding. Follow-up Disposition:  Return in about 1 month (around 9/8/2017).     Glenda Gilman MD

## 2017-08-08 NOTE — PROGRESS NOTES
Yany Melton is a 80 y.o.  female presents today for office visit for transitional care. Pt is in Room # 4. This patient is accompanied in the office by her son, Tone Cordoba. 1. Have you been to the ER, urgent care clinic since your last visit? Hospitalized since your last visit? Yes [facility] on 7/29/17-8/1/17 for UTI     2. Have you seen or consulted any other health care providers outside of the [address] [place] [address] since your last visit? Include any pap smears or colon screening. Yes Orthopedic, Dr. Michelle Valiente, 8/7/17, PT Home Health, [facility] weekly. HM deferred.     Upcoming Appts  Dr. Michelle Valiente, Orthopedic 8/31/17

## 2017-08-10 LAB — BACTERIA UR CULT: NORMAL

## 2017-08-10 NOTE — PROGRESS NOTES
Urine culture did not grow any pathogen. Will re-culture one week or so after treatment is finished.

## 2017-08-15 ENCOUNTER — PATIENT OUTREACH (OUTPATIENT)
Dept: FAMILY MEDICINE CLINIC | Facility: CLINIC | Age: 82
End: 2017-08-15

## 2017-08-15 NOTE — PROGRESS NOTES
Patient admitted to [facility] on 7/29/17-8/1/17 for UTI . Patient discharged to home with Mikala Perez on 8/1/17. Attempt to contact patient via telephone on 8/15/17 for post hospital  follow up. Unable to reach. Left message on voicemail with office contact information. No Patient medical information details left on message.     Noted upcoming appointment  8/25/2017 2:30 PM Alesia Liao MD

## 2017-08-21 ENCOUNTER — TELEPHONE (OUTPATIENT)
Dept: FAMILY MEDICINE CLINIC | Facility: CLINIC | Age: 82
End: 2017-08-21

## 2017-08-21 DIAGNOSIS — N10 ACUTE PYELONEPHRITIS WITHOUT LESION OF RENAL MEDULLARY NECROSIS: Primary | ICD-10-CM

## 2017-08-21 RX ORDER — CHLORPHENIRAMINE MALEATE 4 MG
TABLET ORAL 2 TIMES DAILY
COMMUNITY
End: 2018-01-15

## 2017-08-21 NOTE — TELEPHONE ENCOUNTER
Spoke with the pt's son and spoke with pt in regards to lab results. Two pt identifier's and permission to release verified. Relayed 's notes, Urine culture did not grow any pathogen.  Will re-culture one week or so after treatment is finished. Pt's son acknowledges understanding and pt finished ABT last week and will come into the office to provide a urine specimen. In addition, pts' son states it appears the pt had a rash under her breasts and using clotrimazole to treat. He states the problem has resolved. Pt's son voices no further concerns at this time.

## 2017-08-28 ENCOUNTER — PATIENT OUTREACH (OUTPATIENT)
Dept: FAMILY MEDICINE CLINIC | Facility: CLINIC | Age: 82
End: 2017-08-28

## 2017-08-28 NOTE — PROGRESS NOTES
This note will not be viewable in [address] Patient admitted to [facility] on 7/29/17-8/1/17 for UTI . Patient discharged to home with Mikala Perez on 8/1/17. Attempt to contact patient/caregiver  via telephone on 8/28/17 for post hospital follow up. Reached Mr. Julissa Parekh, patient's son on phone. Patient's son is listed under Pt. Permission to disclosed Information. Patient's son stated Master Every is doing good. \" Patient's son states that PT just left their house. Patient's son denied patient's having chest pain, shortness of breath, fever, chills, nausea, vomiting, and s/s of infection. Patient's son states that Arm Cast will be off this week. No problem with eating and urinating as per Pt. Son. No further questions, concerns, needs and/or assistance at this time as per Pt. Son. Patient son thanked us for follow up.

## 2017-09-01 ENCOUNTER — PATIENT OUTREACH (OUTPATIENT)
Dept: FAMILY MEDICINE CLINIC | Facility: CLINIC | Age: 82
End: 2017-09-01

## 2017-09-01 NOTE — PROGRESS NOTES
This note will not be viewable in [address] Patient admitted to [facility] on 7/29/17-8/1/17 for UTI . Patient discharged to home with Mikala Perez on 8/1/17. Patient and patient's son in the office today for patient to provide urine sample for urine culture. Patient is  unable to provide urine at this time per nurse easton. Briefly seen patient and patient's son as they are leaving the office. Per son states that patient is well.

## 2017-09-12 ENCOUNTER — TELEPHONE (OUTPATIENT)
Dept: FAMILY MEDICINE CLINIC | Facility: CLINIC | Age: 82
End: 2017-09-12

## 2017-09-12 NOTE — TELEPHONE ENCOUNTER
Received a call from [place], Mikala Perez,  To notify the pt will be discharge from [facility] on 9/21/17.

## 2017-09-15 ENCOUNTER — PATIENT OUTREACH (OUTPATIENT)
Dept: FAMILY MEDICINE CLINIC | Facility: CLINIC | Age: 82
End: 2017-09-15

## 2017-10-19 ENCOUNTER — HOSPITAL ENCOUNTER (OUTPATIENT)
Dept: LAB | Age: 82
Discharge: HOME | End: 2017-10-19

## 2017-10-19 ENCOUNTER — OFFICE VISIT (OUTPATIENT)
Dept: FAMILY MEDICINE CLINIC | Facility: CLINIC | Age: 82
End: 2017-10-19

## 2017-10-19 VITALS
RESPIRATION RATE: 15 BRPM | HEIGHT: 65 IN | TEMPERATURE: 96.6 F | BODY MASS INDEX: 19.66 KG/M2 | SYSTOLIC BLOOD PRESSURE: 131 MMHG | DIASTOLIC BLOOD PRESSURE: 68 MMHG | WEIGHT: 118 LBS | HEART RATE: 90 BPM

## 2017-10-19 DIAGNOSIS — R63.0 ANOREXIA: Primary | ICD-10-CM

## 2017-10-19 DIAGNOSIS — R63.4 WEIGHT LOSS: ICD-10-CM

## 2017-10-19 DIAGNOSIS — G89.29 CHRONIC BILATERAL LOW BACK PAIN, WITH SCIATICA PRESENCE UNSPECIFIED: ICD-10-CM

## 2017-10-19 DIAGNOSIS — R53.83 FATIGUE, UNSPECIFIED TYPE: ICD-10-CM

## 2017-10-19 DIAGNOSIS — M54.5 CHRONIC BILATERAL LOW BACK PAIN, WITH SCIATICA PRESENCE UNSPECIFIED: ICD-10-CM

## 2017-10-19 DIAGNOSIS — Z87.440 HISTORY OF URINARY TRACT INFECTION: ICD-10-CM

## 2017-10-19 PROCEDURE — 99001 SPECIMEN HANDLING PT-LAB: CPT | Performed by: NURSE PRACTITIONER

## 2017-10-19 NOTE — PROGRESS NOTES
Rangel Abernathy is a 80 y.o.  female presents today for office  visit for follow up. Pt is in Room # 9      1. Have you been to the ER, urgent care clinic since your last visit? Hospitalized since your last visit? No    2. Have you seen or consulted any other health care providers outside of the [address] [place] [address] since your last visit? Include any pap smears or colon screening. GI doctor    Health Maintenance reviewed caregiver declined flu vx.

## 2017-10-19 NOTE — PROGRESS NOTES
Today's Date:  10/19/2017   Patient:  Bryce Welch  Patient :  [id number]    Subjective:   Bryce Welch is a 80 y.o. female who presents for follow up for GERD, and results of her pathology report from GI. Patient's son is requesting a back brace to help her stand  Up straight. Patient son reports that patient had a bout of diarrhea for three days last weeks and she lost some weight despite no change in her diet. No other changes or concerns . Current Outpatient Meds and Allergies     Current Outpatient Prescriptions on File Prior to Visit   Medication Sig Dispense Refill    clotrimazole (LOTRIMIN AF) 1 % topical cream Apply  to affected area two (2) times a day.  multivitamin (ONE A DAY) tablet Take 1 Tab by mouth daily.  DOCOSAHEXANOIC ACID/EPA (FISH OIL PO) Take 1 Cap by mouth daily.  ferrous sulfate 300 mg (60 mg iron)/5 mL syrup Take 3 mL by mouth three (3) times daily (with meals). 250 mL 1    raNITIdine (ZANTAC) 150 mg tablet Take 1 Tab by mouth two (2) times a day. 60 Tab 1    ACETAMINOPHEN (TYLENOL PO) Take 1 Tab by mouth daily as needed.  ginkgo biloba 120 mg tab Take  by mouth.  vitamin e (E GEMS) 100 unit capsule Take  by mouth daily.  Ca-D3-mag-zinc--kristina-boron (CALTRATE 600+D PLUS MINERALS) 600 mg calcium- 800 unit-40 mg chew Take 1 Tab by mouth two (2) times a day. 60 Tab 3    polyethylene glycol (MIRALAX) 17 gram/dose powder Take 17 g by mouth daily as needed. 510 g 0    GLUCOSAMINE/CHONDROITN/NA/C/SE (JOINT FORMULA PO) Take  by mouth. Two capsules in the morning and two capsules in the evening. No current facility-administered medications on file prior to visit. These medications have been reviewed and reconciled with the patient during today's visit.       Allergies   Allergen Reactions    Shellfish Derived Anaphylaxis    Salonpas-Hot [Capsaicin] Rash    Iodine Not Reported This Time    Oxycodone-Acetaminophen Other (comments) Son states, \"it makes her crazy\"          ROS:     CONST:    admits to  weight loss of five pounds over the past month. NEURO:   Denies headaches, vision changes, dizziness, loss of consciousness  CV:      Denies chest pain, palpitations, orthopnea, PND  PULM:  Denies SOB, wheezing, cough, hemoptysis  GI:             Denies nausea, greasy stools, blood in stool, + vomiting small amounts after meals, and occasional abdominal pain,     diarrhea, and constipation  :       Denies dysuria, hematuria, change in urine  MS:      + Low back pain  SKIN:        Denies rashes, skin changes  ALLERGY: Denies seasonal allergies, itchy eyes  HEME: Denies easy bleeding/bruising    Objective:     VS:    Visit Vitals    /68 (BP 1 Location: Right arm, BP Patient Position: Sitting)    Pulse 90    Temp 96.6 °F (35.9 °C) (Oral)    Resp 15    Ht 5' 5\" (1.651 m)    Wt 118 lb (53.5 kg)  Comment: when patient went to gastro. (mth ago)    BMI 19.64 kg/m2       General:   Well-nourished, well-groomed, pleasant, alert, in no acute distress. Head:  Normocephalic, atraumatic, MMM,   Ears:  External ears WNL, TMs WNL,   Eyes:  EOMI, PERRL,   Nose:  External nares WNL  Neck:  Neck supple with normal ROM for age, no thyromegaly,  Cardiovasc:   Regular rate and rhythm, no murmurs, no rubs, no gallops,   Pulmonary:   Clear breath sounds bilaterally, good air movement, no wheezing, no rales, no rhonchi, normal respiratory effort  Abdomen:   Abdomen soft, nontender, nondistended, NABS,  Extremities:   No edema, no tenderness with palpation of calves, warm and well-perfused  Neuro:   sleepy, conversant, appropriate, following commands, no focal deficits. Pertinent diagnostic procedures include:  No results found for this or any previous visit (from the past 24 hour(s)). Assessment:       1. Anorexia    2. Weight loss    3. Fatigue, unspecified type    4. History of urinary tract infection    5.  Chronic bilateral low back pain, with sciatica presence unspecified        Plan:       Orders Placed This Encounter    CULTURE, URINE     Standing Status:   Future     Standing Expiration Date:   10/20/2018    URINALYSIS W/MICROSCOPIC     Standing Status:   Future     Standing Expiration Date:   10/20/2018    IRON PROFILE     Standing Status:   Future     Standing Expiration Date:   10/20/2018    CBC WITH AUTOMATED DIFF     Standing Status:   Future     Standing Expiration Date:   10/20/2018    REFERRAL TO ORTHOPEDICS     Referral Priority:   Routine     Referral Type:   Consultation     Referral Reason:   Specialty Services Required     Requested Specialty:   Orthopedic Surgery     The pathology report is not available- will contact family with results when it is available.     Freddie Marie NP-C  [facility]  [address] [place], [address]  Phone [phone]  Fax [phone]

## 2017-10-21 LAB
BACTERIA UR CULT: NORMAL
BASOPHILS # BLD AUTO: 0 X10E3/UL (ref 0–0.2)
BASOPHILS NFR BLD AUTO: 0 %
EOSINOPHIL # BLD AUTO: 0.1 X10E3/UL (ref 0–0.4)
EOSINOPHIL NFR BLD AUTO: 1 %
ERYTHROCYTE [DISTWIDTH] IN BLOOD BY AUTOMATED COUNT: 14.1 % (ref 12.3–15.4)
GLUCOSE UR QL: NORMAL
HCT VFR BLD AUTO: 36.8 % (ref 34–46.6)
HGB BLD-MCNC: 12.2 G/DL (ref 11.1–15.9)
IMM GRANULOCYTES # BLD: 0 X10E3/UL (ref 0–0.1)
IMM GRANULOCYTES NFR BLD: 0 %
IRON SATN MFR SERPL: 28 % (ref 15–55)
IRON SERPL-MCNC: 68 UG/DL (ref 27–139)
KETONES UR QL STRIP: NORMAL
LYMPHOCYTES # BLD AUTO: 1.5 X10E3/UL (ref 0.7–3.1)
LYMPHOCYTES NFR BLD AUTO: 19 %
MCH RBC QN AUTO: 27.6 PG (ref 26.6–33)
MCHC RBC AUTO-ENTMCNC: 33.2 G/DL (ref 31.5–35.7)
MCV RBC AUTO: 83 FL (ref 79–97)
MONOCYTES # BLD AUTO: 0.6 X10E3/UL (ref 0.1–0.9)
MONOCYTES NFR BLD AUTO: 7 %
NEUTROPHILS # BLD AUTO: 5.8 X10E3/UL (ref 1.4–7)
NEUTROPHILS NFR BLD AUTO: 73 %
PH UR STRIP: NORMAL [PH]
PLATELET # BLD AUTO: 181 X10E3/UL (ref 150–379)
PROT UR QL STRIP: NORMAL
RBC # BLD AUTO: 4.42 X10E6/UL (ref 3.77–5.28)
SP GR UR: NORMAL
TIBC SERPL-MCNC: 242 UG/DL (ref 250–450)
UIBC SERPL-MCNC: 174 UG/DL (ref 118–369)
WBC # BLD AUTO: 8 X10E3/UL (ref 3.4–10.8)

## 2017-10-25 NOTE — PROGRESS NOTES
Your blood count is normal  Your urinalysis is normal your urine culture is normal  Your iron profile is slightly abnormal and insignificant

## 2017-10-26 ENCOUNTER — TELEPHONE (OUTPATIENT)
Dept: FAMILY MEDICINE CLINIC | Facility: CLINIC | Age: 82
End: 2017-10-26

## 2017-10-26 NOTE — TELEPHONE ENCOUNTER
Your blood count is normal   Your urinalysis is normal your urine culture is normal   Your iron profile is slightly abnormal and insignificant    Spoke with pt's son in regards to results. Pt's son acknowledges understanding and voices no concerns at this time.

## 2017-11-17 ENCOUNTER — OFFICE VISIT (OUTPATIENT)
Dept: FAMILY MEDICINE CLINIC | Facility: CLINIC | Age: 82
End: 2017-11-17

## 2017-11-17 VITALS
HEART RATE: 84 BPM | SYSTOLIC BLOOD PRESSURE: 149 MMHG | WEIGHT: 118 LBS | BODY MASS INDEX: 19.66 KG/M2 | HEIGHT: 65 IN | OXYGEN SATURATION: 94 % | DIASTOLIC BLOOD PRESSURE: 78 MMHG | RESPIRATION RATE: 16 BRPM | TEMPERATURE: 96.9 F

## 2017-11-17 DIAGNOSIS — R11.11 VOMITING WITHOUT NAUSEA, INTRACTABILITY OF VOMITING NOT SPECIFIED, UNSPECIFIED VOMITING TYPE: Primary | ICD-10-CM

## 2017-11-17 NOTE — PROGRESS NOTES
Michael Duvall is a 80 y.o.  female presents today for office visit for follow up. Pt would also like to discuss wt loss. Pt is not fasting. Pt is in Room # 4      1. Have you been to the ER, urgent care clinic since your last visit? Hospitalized since your last visit? No    2. Have you seen or consulted any other health care providers outside of the [address] [place] [address] since your last visit? Include any pap smears or colon screening. No    Upcoming Appts  none    Health Maintenance reviewed       VORB: No orders of the defined types were placed in this encounter.   Bonita Cedeno LPN

## 2017-11-17 NOTE — PROGRESS NOTES
Today's Date:  2017   Patient:  Ifeoma Ruff  Patient :  [id number]    Subjective:   Ifeoma Ruff is a 80 y.o. female who presents for follow for vomiting. Her son reports that she is vomiting every night when she goes to bed. He states that she usually sit up approx. 2-3 hours after eating before she lay down but she still vomits. He also states that immediately before she vomits, she hold her left side as if she is in pain. Advance Mellmaria elena He states that she is more tired than normal.  She is having regular bowel movements,  Eating,  drinking normally, and  without difficulty. Current Outpatient Meds and Allergies     Current Outpatient Prescriptions on File Prior to Visit   Medication Sig Dispense Refill    clotrimazole (LOTRIMIN AF) 1 % topical cream Apply  to affected area two (2) times a day.  multivitamin (ONE A DAY) tablet Take 1 Tab by mouth daily.  DOCOSAHEXANOIC ACID/EPA (FISH OIL PO) Take 1 Cap by mouth daily.  ferrous sulfate 300 mg (60 mg iron)/5 mL syrup Take 3 mL by mouth three (3) times daily (with meals). 250 mL 1    raNITIdine (ZANTAC) 150 mg tablet Take 1 Tab by mouth two (2) times a day. 60 Tab 1    ACETAMINOPHEN (TYLENOL PO) Take 1 Tab by mouth daily as needed.  ginkgo biloba 120 mg tab Take  by mouth.  vitamin e (E GEMS) 100 unit capsule Take  by mouth daily.  Ca-D3-mag-zinc--kristina-boron (CALTRATE 600+D PLUS MINERALS) 600 mg calcium- 800 unit-40 mg chew Take 1 Tab by mouth two (2) times a day. 60 Tab 3    polyethylene glycol (MIRALAX) 17 gram/dose powder Take 17 g by mouth daily as needed. 510 g 0    GLUCOSAMINE/CHONDROITN/NA/C/SE (JOINT FORMULA PO) Take  by mouth. Two capsules in the morning and two capsules in the evening. No current facility-administered medications on file prior to visit. These medications have been reviewed and reconciled with the patient during today's visit.       Allergies   Allergen Reactions    Shellfish Derived Anaphylaxis    Salonpas-Hot [Capsaicin] Rash    Iodine Not Reported This Time    Oxycodone-Acetaminophen Other (comments)     Son states, \"it makes her crazy\"          ROS:     CONST:   + fatigue, weight change Denies appetite change   NEURO:   Denies headaches, vision changes, dizziness, loss of consciousness  CV:      Denies chest pain, palpitations, orthopnea, PND  PULM:  Denies SOB, wheezing, cough, hemoptysis  GI:             + nausea, vomiting, abdominal pain. Denies greasy stools, blood in stool,     diarrhea, constipation  :       Denies dysuria, hematuria, change in urine  MS:      Denies muscle/joint pain, joint swelling  SKIN:        Denies rashes, skin changes  ALLERGY: Denies seasonal allergies, itchy eyes  HEME: Denies easy bleeding/bruising    Objective:     VS:    Visit Vitals    /78 (BP 1 Location: Right arm, BP Patient Position: Sitting)    Pulse 84    Temp 96.9 °F (36.1 °C) (Oral)    Resp 16    Ht 5' 5\" (1.651 m)    Wt 118 lb (53.5 kg)    SpO2 94%    BMI 19.64 kg/m2       General:   Well-nourished, well-groomed, pleasant, alert, in no acute distress. Head:  Normocephalic, atraumatic  Pulmonary:    normal respiratory  Extremities:  No edema  Neuro:   Alert, conversant, appropriate, following commands, no focal deficits. Pertinent diagnostic procedures include:  No results found for this or any previous visit (from the past 24 hour(s)). Assessment:       1. Vomiting without nausea, intractability of vomiting not specified, unspecified vomiting type        Plan:       Orders Placed This Encounter    XR ABD (KUB)     Standing Status:   Future     Standing Expiration Date:   12/17/2018     Order Specific Question:   Reason for Exam     Answer:   continuous abdominal pain with persistant vomiting. Kofin have an appointment with GI on 11/28 for follow-up.   I have discussed the diagnosis with the patient and her son the intended plan as seen in the above order   Pt verbalized understanding.     Ok Silva NP-C  [facility]  [address], [address]  Phone [phone]  Fax [phone]

## 2018-01-15 ENCOUNTER — OFFICE VISIT (OUTPATIENT)
Dept: FAMILY MEDICINE CLINIC | Age: 83
End: 2018-01-15

## 2018-01-15 VITALS
RESPIRATION RATE: 16 BRPM | DIASTOLIC BLOOD PRESSURE: 44 MMHG | TEMPERATURE: 97.5 F | HEIGHT: 65 IN | HEART RATE: 62 BPM | SYSTOLIC BLOOD PRESSURE: 104 MMHG | BODY MASS INDEX: 19.49 KG/M2 | WEIGHT: 117 LBS | OXYGEN SATURATION: 92 %

## 2018-01-15 DIAGNOSIS — K21.9 GASTROESOPHAGEAL REFLUX DISEASE, ESOPHAGITIS PRESENCE NOT SPECIFIED: ICD-10-CM

## 2018-01-15 DIAGNOSIS — R63.0 ANOREXIA: ICD-10-CM

## 2018-01-15 DIAGNOSIS — R32 URINARY INCONTINENCE, UNSPECIFIED TYPE: ICD-10-CM

## 2018-01-15 DIAGNOSIS — E11.9 TYPE 2 DIABETES MELLITUS WITHOUT COMPLICATION, WITHOUT LONG-TERM CURRENT USE OF INSULIN (HCC): Primary | ICD-10-CM

## 2018-01-15 DIAGNOSIS — N30.00 ACUTE CYSTITIS WITHOUT HEMATURIA: ICD-10-CM

## 2018-01-15 RX ORDER — MULTIVIT WITH MINERALS/HERBS
1 TABLET ORAL DAILY
COMMUNITY
End: 2018-03-12

## 2018-01-15 NOTE — PROGRESS NOTES
SUBJECTIVE    Chief Complaint   Patient presents with    GERD     aging issues    Incontinence     urinary; wants checked for rash. HPI:     Patient comes in with her son for FU of \"aging issues\". She has been incontinent of urine and wears diaper. Her family wants to check for any skin problem around her perineum. She has no complaints. No chills fever, N&V. No other urinary symptoms. Her GERD, vomiting at bed time is better on ranitidine. Her constipation is being treated with Colace and PRN Miralax. She has H/O DM; but her HgbA1c has been good for the last few years WO Rx. Past Medical History:   Diagnosis Date    Back pain     Comminuted left humeral fracture 07/2017    DJD (degenerative joint disease)     mostly of knees    DM (diabetes mellitus) (HCC)     Fatigue     GERD (gastroesophageal reflux disease)     w/chronic pulmonary fibrosis    Herpes zoster     Lumbar spondylosis     LBP    Motion sickness     chronic    PVCs (premature ventricular contractions)     recurrent    Varicose veins     Venous insufficiency     Lt LE     No past surgical history on file. Current Outpatient Prescriptions   Medication Sig    b complex vitamins (B COMPLEX 1) tablet Take 1 Tab by mouth daily.  multivitamin (ONE A DAY) tablet Take 1 Tab by mouth daily.  DOCOSAHEXANOIC ACID/EPA (FISH OIL PO) Take 1 Cap by mouth daily.  raNITIdine (ZANTAC) 150 mg tablet Take 1 Tab by mouth two (2) times a day. (Patient taking differently: Take 150 mg by mouth daily.)    ACETAMINOPHEN (TYLENOL PO) Take 325 mg by mouth daily as needed.  ginkgo biloba 120 mg tab Take 1 Tab by mouth daily.  vitamin e (E GEMS) 100 unit capsule Take 100 Units by mouth daily.  Ca-D3-mag-zinc--kristina-boron (CALTRATE 600+D PLUS MINERALS) 600 mg calcium- 800 unit-40 mg chew Take 1 Tab by mouth two (2) times a day.  (Patient taking differently: Take 1 Tab by mouth daily.)    polyethylene glycol (MIRALAX) 17 gram/dose powder Take 17 g by mouth daily as needed.  GLUCOSAMINE/CHONDROITN/NA/C/SE (JOINT FORMULA PO) Take 1 Cap by mouth daily. No current facility-administered medications for this visit. Allergies: Iodine    ROS:   Constitutional: No fever, chills, night sweats, malaise. Cardiovascular: No angina, palpitations, PND, orthopnea, lightheadedness, edema, claudication. Respiratory: No pleurisy, hemoptysis, unusual sputum. Gastrointestinal: no pain, melena, hematochezia. Psychiatric: No agitation, suicidal or homicidal ideation. Musculoskeletal: chronic back problem and gait difficulty. DJD of joints. Genitourinary: No dysuria, urgency, frequency. .      OBJECTIVE  Constitutional: General Appearance:  well developed, well nourished, nontoxic, in no acute distress. Visit Vitals    /44 (BP 1 Location: Left arm, BP Patient Position: Sitting)    Pulse 62    Temp 97.5 °F (36.4 °C)    Resp 16    Ht 5' 5\" (1.651 m)    Wt 117 lb (53.1 kg)    SpO2 92%    BMI 19.47 kg/m2       Pulmonary: Respiratory effort: normal; no dyspnea, no retractions, no accessory muscle use. Auscultation: normal & symmetrical air exchange; coarse rales, most prominent at Rt lower area; no rhonchi; no wheeze; no rubs. Cardiovascular: Auscultation: RRR; no murmur, rubs; S4 gallop unchanged. Extremities: no edema. GI[de-identified] Normal bowel sounds. No masses; no tenderness; no rebound/rigidity; no CVA tenderness. No hepatosplenomegaly. Bladder: no fullnesses, tenderness or palpable masses. Psychiatric: Oriented to time, place and person. Musculoskeletal:   NC/AT, neck supple.     Psychiatric: Mood/affect normal.     Skin: perineum is clear; without rash, ulcers, lesions    Lab Results   Component Value Date/Time    WBC 8.0 10/19/2017 04:03 AM    HGB 12.2 10/19/2017 04:03 AM    HCT 36.8 10/19/2017 04:03 AM    PLATELET 825 71/78/0652 04:03 AM    MCV 83 10/19/2017 04:03 AM       Lab Results   Component Value Date/Time    Hemoglobin A1c 5.8 03/15/2017 01:45 AM     Lab Results   Component Value Date/Time    TSH 4.000 03/15/2017 01:45 AM       Basic Metabolic Panel (BMP) (05/52/9201 3:50 AM)  Basic Metabolic Panel (BMP) (40/53/6068 3:50 AM)   Component Value Ref Range   POTASSIUM 3.9 3.5 - 5.5 mmol/L   SODIUM 134 133 - 145 mmol/L   CHLORIDE 99 98 - 110 mmol/L   GLUCOSE 95 65 - 99 mg/dL   CALCIUM 9.1 8.4 - 10.5 mg/dL   BUN 11 6 - 22 mg/dL   CREATININE 0.7 (L) 0.8 - 1.4 mg/dL   CO2 26 20 - 32 mmol/L   eGFR African American >60.0 >60.0   eGFR Non African American >60.0 >60.0   ANION GAP 8.7 mmol/L         HEPATIC FUNCTION PANEL (07/18/2017 3:10 AM)  HEPATIC FUNCTION PANEL (07/18/2017 3:10 AM)   Component Value Ref Range   ALBUMIN 3.6 3.5 - 5.0 g/dL   TOTAL PROTEIN 6.7 6.2 - 8.1 g/dL   GLOBULIN SERUM 3.1 2.0 - 4.0 g/dL   A/G RATIO 1.2 1.1 - 2.6 ratio   BILIRUBIN TOTAL 0.6 0.2 - 1.2 mg/dL   BILIRUBIN DIRECT <0.2 0.0 - 0.3 mg/dL   SGOT (AST) 17 10 - 37 U/L   ALKALINE PHOSPHATASE 59 40 - 120 U/L   SGPT (ALT) 11 5 - 40 U/L       ASSESSMENT    1. Type 2 diabetes mellitus without complication, without long-term current use of insulin ([address]    2. Gastroesophageal reflux disease, esophagitis presence not specified    3. Urinary incontinence, unspecified type        PLAN    Orders Placed This Encounter    HEMOGLOBIN A1C WITH EAG    HEPATIC FUNCTION PANEL    URINALYSIS W/ RFLX MICROSCOPIC    b complex vitamins (B COMPLEX 1) tablet     Pharmacologic Management: Medications reviewed with the son. No change. Perineum care discussed. Discussed DDx, follow-up & work-up. Discussed risk/benefit/side effect. Increase fluid intake. Follow up in as scheduled, prn sooner. PRN to ER. Health risk from non adherence discussed. Patient/ son/ D-in-law voiced understanding. Follow-up Disposition:  Return in about 3 months (around 4/15/2018).     Noelle Santamaria MD

## 2018-01-15 NOTE — PROGRESS NOTES
Le Bustamante is a 80 y.o. female presents in office with c/o aging issues. Health Maintenance Due   Topic Date Due    EYE EXAM RETINAL OR DILATED Q1  01/01/1935    GLAUCOMA SCREENING Q2Y  01/01/1990    Influenza Age 5 to Adult  08/01/2017    HEMOGLOBIN A1C Q6M  09/15/2017       1. Have you been to the ER, urgent care clinic since your last visit? Hospitalized since your last visit? No    2. Have you seen or consulted any other health care providers outside of the [address] [place] [address] since your last visit? Include any pap smears or colon screening.  No

## 2018-01-15 NOTE — PATIENT INSTRUCTIONS

## 2018-01-19 LAB
ALBUMIN SERPL-MCNC: NORMAL G/DL
ALP SERPL-CCNC: NORMAL U/L
ALT SERPL-CCNC: NORMAL U/L
APPEARANCE UR: CLEAR
AST SERPL-CCNC: NORMAL U/L
BACTERIA #/AREA URNS HPF: ABNORMAL /[HPF]
BILIRUB DIRECT SERPL-MCNC: NORMAL MG/DL
BILIRUB SERPL-MCNC: NORMAL MG/DL
BILIRUB UR QL STRIP: NEGATIVE
CASTS URNS QL MICRO: ABNORMAL /LPF
COLOR UR: YELLOW
CRYSTALS URNS MICRO: ABNORMAL
EPI CELLS #/AREA URNS HPF: ABNORMAL /HPF
GLUCOSE UR QL: NEGATIVE
HBA1C MFR BLD: NORMAL %
HGB UR QL STRIP: NEGATIVE
KETONES UR QL STRIP: NEGATIVE
LEUKOCYTE ESTERASE UR QL STRIP: ABNORMAL
MICRO URNS: ABNORMAL
MUCOUS THREADS URNS QL MICRO: PRESENT
NITRITE UR QL STRIP: NEGATIVE
PH UR STRIP: 7 [PH] (ref 5–7.5)
PROT SERPL-MCNC: NORMAL G/DL
PROT UR QL STRIP: NEGATIVE
RBC #/AREA URNS HPF: ABNORMAL /HPF
SP GR UR: 1.01 (ref 1–1.03)
UNIDENT CRYS URNS QL MICRO: PRESENT
UROBILINOGEN UR STRIP-MCNC: 0.2 MG/DL (ref 0.2–1)
WBC #/AREA URNS HPF: ABNORMAL /HPF

## 2018-01-22 ENCOUNTER — TELEPHONE (OUTPATIENT)
Dept: FAMILY MEDICINE CLINIC | Age: 83
End: 2018-01-22

## 2018-01-22 NOTE — TELEPHONE ENCOUNTER
----- Message from Kanu Torres MD sent at 1/20/2018 12:31 AM EST -----  Urine shows possible infection. If symptomatic will need urine sample for culture.

## 2018-02-01 NOTE — PROGRESS NOTES
Please call patient if she is having any symptoms like fever or urinary symptoms to come for urine culture .

## 2018-02-02 ENCOUNTER — TELEPHONE (OUTPATIENT)
Dept: FAMILY MEDICINE CLINIC | Age: 83
End: 2018-02-02

## 2018-02-02 NOTE — TELEPHONE ENCOUNTER
----- Message from Kendal Graham MD sent at 2/1/2018  1:53 PM EST -----  Please call patient if she is having any symptoms like fever or urinary symptoms to come for urine culture .

## 2018-03-08 ENCOUNTER — TELEPHONE (OUTPATIENT)
Dept: FAMILY MEDICINE CLINIC | Age: 83
End: 2018-03-08

## 2018-03-08 DIAGNOSIS — N39.0 RECURRENT UTI: Primary | ICD-10-CM

## 2018-03-12 ENCOUNTER — OFFICE VISIT (OUTPATIENT)
Dept: FAMILY MEDICINE CLINIC | Age: 83
End: 2018-03-12

## 2018-03-12 VITALS
WEIGHT: 109 LBS | BODY MASS INDEX: 18.16 KG/M2 | OXYGEN SATURATION: 97 % | SYSTOLIC BLOOD PRESSURE: 106 MMHG | DIASTOLIC BLOOD PRESSURE: 44 MMHG | RESPIRATION RATE: 18 BRPM | TEMPERATURE: 97.3 F | HEART RATE: 88 BPM | HEIGHT: 65 IN

## 2018-03-12 DIAGNOSIS — R63.4 WEIGHT LOSS: ICD-10-CM

## 2018-03-12 DIAGNOSIS — E11.9 TYPE 2 DIABETES MELLITUS WITHOUT COMPLICATION, WITHOUT LONG-TERM CURRENT USE OF INSULIN (HCC): ICD-10-CM

## 2018-03-12 DIAGNOSIS — I63.9 CEREBELLAR STROKE (HCC): ICD-10-CM

## 2018-03-12 DIAGNOSIS — R11.10 NON-INTRACTABLE VOMITING, PRESENCE OF NAUSEA NOT SPECIFIED, UNSPECIFIED VOMITING TYPE: Primary | ICD-10-CM

## 2018-03-12 DIAGNOSIS — S06.300D TRAUMATIC INTRACRANIAL HEMORRHAGE WITHOUT LOSS OF CONSCIOUSNESS, SUBSEQUENT ENCOUNTER: ICD-10-CM

## 2018-03-12 LAB
APPEARANCE UR: ABNORMAL
BACTERIA UR CULT: NORMAL
BILIRUB UR QL STRIP: NEGATIVE
COLOR UR: YELLOW
GLUCOSE UR QL: NEGATIVE
HGB UR QL STRIP: NEGATIVE
KETONES UR QL STRIP: NEGATIVE
LEUKOCYTE ESTERASE UR QL STRIP: NEGATIVE
MICRO URNS: ABNORMAL
NITRITE UR QL STRIP: NEGATIVE
PH UR STRIP: 8.5 [PH] (ref 5–7.5)
PROT UR QL STRIP: NEGATIVE
SP GR UR: 1.01 (ref 1–1.03)
UROBILINOGEN UR STRIP-MCNC: 0.2 MG/DL (ref 0.2–1)

## 2018-03-12 NOTE — PROGRESS NOTES
Mariela Malave is a 80 y.o. female presents in office to f/u from GI referral.     Health Maintenance Due   Topic Date Due    EYE EXAM RETINAL OR DILATED Q1  01/01/1935    GLAUCOMA SCREENING Q2Y  01/01/1990    Influenza Age 5 to Adult  08/01/2017    FOOT EXAM Q1  03/14/2018    MICROALBUMIN Q1  03/15/2018       1. Have you been to the ER, urgent care clinic since your last visit? Hospitalized since your last visit? No    2. Have you seen or consulted any other health care providers outside of the [address] [place] [address] since your last visit? Include any pap smears or colon screening.  No

## 2018-03-12 NOTE — PROGRESS NOTES
SUBJECTIVE    Chief Complaint   Patient presents with    Referral Follow Up     Saw the GI doctor about vomiting. HPI:     Patient comes in with her son for FU of \"vomitting\". Son says that he stopped her Zantac because she no longer has GERD, even though she has been having some vomiting. She has already seen GI and had CT scan ordered. CT showed large amount of stool in her colon; but her son says that she is not constipated. He also says that she is not nauseated; but vomits only when pushed to eat. She denies any UTI symptoms. No acute cerebellar stroke symptoms. She has recovered form head injury followed by hemorrhage. She has H/O DM; but her HgbA1c has been good for the last few years WO Rx. Past Medical History:   Diagnosis Date    Back pain     Comminuted left humeral fracture 07/2017    DJD (degenerative joint disease)     mostly of knees    DM (diabetes mellitus) (HCC)     Fatigue     GERD (gastroesophageal reflux disease)     w/chronic pulmonary fibrosis    Herpes zoster     Lumbar spondylosis     LBP    Motion sickness     chronic    PVCs (premature ventricular contractions)     recurrent    Varicose veins     Venous insufficiency     Lt LE     History reviewed. No pertinent surgical history. Current Outpatient Prescriptions   Medication Sig    multivitamin (ONE A DAY) tablet Take 1 Tab by mouth daily.  DOCOSAHEXANOIC ACID/EPA (FISH OIL PO) Take 1 Cap by mouth daily.  ACETAMINOPHEN (TYLENOL PO) Take 325 mg by mouth daily as needed.  ginkgo biloba 120 mg tab Take 1 Tab by mouth daily.  vitamin e (E GEMS) 100 unit capsule Take 100 Units by mouth daily.  Ca-D3-mag-zinc--kristina-boron (CALTRATE 600+D PLUS MINERALS) 600 mg calcium- 800 unit-40 mg chew Take 1 Tab by mouth two (2) times a day. (Patient taking differently: Take 1 Tab by mouth daily.)    polyethylene glycol (MIRALAX) 17 gram/dose powder Take 17 g by mouth daily as needed.     GLUCOSAMINE/CHONDROITN/NA/C/SE (JOINT FORMULA PO) Take 1 Cap by mouth daily.  b complex vitamins (B COMPLEX 1) tablet Take 1 Tab by mouth daily.  raNITIdine (ZANTAC) 150 mg tablet Take 1 Tab by mouth two (2) times a day. (Patient taking differently: Take 150 mg by mouth daily.)     No current facility-administered medications for this visit. Allergies: Iodine    ROS:   Constitutional: No fever, chills, night sweats, malaise. Cardiovascular: No angina, palpitations, PND, orthopnea, lightheadedness, edema, claudication. Respiratory: No pleurisy, hemoptysis, unusual sputum. Gastrointestinal: no pain, melena, hematochezia. Psychiatric: No agitation, suicidal or homicidal ideation. Musculoskeletal: recovered from head injury. chronic back problem and gait difficulty with HO cerebellar stroke. DJD of joints. Genitourinary: No dysuria, urgency, frequency. .      OBJECTIVE  Constitutional: General Appearance:  well developed, lean, nontoxic, in no acute distress. Visit Vitals    /44 (BP 1 Location: Right arm, BP Patient Position: Sitting)    Pulse 88    Temp 97.3 °F (36.3 °C) (Oral)    Resp 18    Ht 5' 5\" (1.651 m)    Wt 109 lb (49.4 kg)    SpO2 97%    BMI 18.14 kg/m2     Pulmonary: Respiratory effort: normal; no dyspnea, no retractions, no accessory muscle use. Auscultation: normal & symmetrical air exchange; coarse rales, most prominent at Rt lower area; no rhonchi; no wheeze; no rubs. Cardiovascular: Auscultation: RRR; no murmur, rubs; S4 gallop unchanged. Extremities: no edema. GI[de-identified] Normal bowel sounds. No masses; no tenderness; no rebound/rigidity; no CVA tenderness. No hepatosplenomegaly. Bladder: no fullnesses, tenderness or palpable masses. Psychiatric: Oriented to time, place and person. Musculoskeletal:   NC/AT, neck supple.     Lab Results   Component Value Date/Time    WBC 8.0 10/19/2017 04:03 AM    HGB 12.2 10/19/2017 04:03 AM    HCT 36.8 10/19/2017 04:03 AM    PLATELET 686 50/57/3084 04:03 AM    MCV 83 10/19/2017 04:03 AM       Lab Results   Component Value Date/Time    TSH 4.000 03/15/2017 01:45 AM         Basic Metabolic Panel (BMP) (23/31/0118 3:50 AM)  Basic Metabolic Panel (BMP) (25/62/8120 3:50 AM)   Component Value Ref Range   POTASSIUM 3.9 3.5 - 5.5 mmol/L   SODIUM 134 133 - 145 mmol/L   CHLORIDE 99 98 - 110 mmol/L   GLUCOSE 95 65 - 99 mg/dL   CALCIUM 9.1 8.4 - 10.5 mg/dL   BUN 11 6 - 22 mg/dL   CREATININE 0.7 (L) 0.8 - 1.4 mg/dL   CO2 26 20 - 32 mmol/L   eGFR African American >60.0 >60.0   eGFR Non African American >60.0 >60.0   ANION GAP 8.7 mmol/L         HEPATIC FUNCTION PANEL (07/18/2017 3:10 AM)  HEPATIC FUNCTION PANEL (07/18/2017 3:10 AM)   Component Value Ref Range   ALBUMIN 3.6 3.5 - 5.0 g/dL   TOTAL PROTEIN 6.7 6.2 - 8.1 g/dL   GLOBULIN SERUM 3.1 2.0 - 4.0 g/dL   A/G RATIO 1.2 1.1 - 2.6 ratio   BILIRUBIN TOTAL 0.6 0.2 - 1.2 mg/dL   BILIRUBIN DIRECT <0.2 0.0 - 0.3 mg/dL   SGOT (AST) 17 10 - 37 U/L   ALKALINE PHOSPHATASE 59 40 - 120 U/L   SGPT (ALT) 11 5 - 40 U/L     3/8/18  U/A- no leukocytes, blood, nitrite. uCulture- multiple organisms. 2/7/18 CT of abdomen:  No CT evidence of acute abdominal abnormality. Very large lower pole right renal cyst. Large amount retained stool in colon, especially in rectosigmoid. Small calcified uterine fibroid. Bibasilar pulmonary interstitial fibrosis. L3-4 central canal stenosis. ASSESSMENT    1. Non-intractable vomiting, presence of nausea not specified, unspecified vomiting type    2. Weight loss    3. Type 2 diabetes mellitus without complication, without long-term current use of insulin (HCC)    4. Cerebellar stroke ([address]    5.  Traumatic intracranial hemorrhage without loss of consciousness, subsequent encounter        PLAN    Orders Placed This Encounter    CBC WITH AUTOMATED DIFF    METABOLIC PANEL, COMPREHENSIVE    HEMOGLOBIN A1C WITH EAG    TSH 3RD GENERATION    T4, FREE Pharmacologic Management: Medications reviewed with the son. Advised son that one reason for vomiting and loss of appetite is large amount of retained stool. She need to have Miralax with cautions until that clears. FU with GI. Discussed DDx, follow-up & work-up. Discussed risk/benefit/side effect. Increase fluid intake. Follow up in as scheduled, prn sooner. PRN to ER. Health risk from non adherence discussed. Patient/ son/ D-in-law voiced understanding. Follow-up Disposition:  Return in about 1 month (around 4/12/2018).     Hema Burger MD

## 2018-03-13 ENCOUNTER — HOSPITAL ENCOUNTER (OUTPATIENT)
Dept: LAB | Age: 83
Discharge: HOME | End: 2018-03-13
Payer: MEDICARE

## 2018-03-13 DIAGNOSIS — R11.10 NON-INTRACTABLE VOMITING, PRESENCE OF NAUSEA NOT SPECIFIED, UNSPECIFIED VOMITING TYPE: ICD-10-CM

## 2018-03-13 DIAGNOSIS — E11.9 TYPE 2 DIABETES MELLITUS WITHOUT COMPLICATION, WITHOUT LONG-TERM CURRENT USE OF INSULIN (HCC): ICD-10-CM

## 2018-03-13 DIAGNOSIS — N39.0 RECURRENT UTI: ICD-10-CM

## 2018-03-13 DIAGNOSIS — R63.4 WEIGHT LOSS: ICD-10-CM

## 2018-03-13 LAB
ALBUMIN SERPL-MCNC: 3.7 G/DL (ref 3.4–5)
ALBUMIN/GLOB SERPL: 0.9 {RATIO} (ref 0.8–1.7)
ALP SERPL-CCNC: 56 U/L (ref 45–117)
ALT SERPL-CCNC: 14 U/L (ref 13–56)
ANION GAP SERPL CALC-SCNC: 9 MMOL/L (ref 3–18)
APPEARANCE UR: CLEAR
AST SERPL-CCNC: 17 U/L (ref 15–37)
BASOPHILS # BLD: 0 K/UL (ref 0–0.06)
BASOPHILS NFR BLD: 1 % (ref 0–2)
BILIRUB SERPL-MCNC: 0.4 MG/DL (ref 0.2–1)
BILIRUB UR QL: NEGATIVE
BUN SERPL-MCNC: 14 MG/DL (ref 7–18)
BUN/CREAT SERPL: 14 (ref 12–20)
CALCIUM SERPL-MCNC: 9.5 MG/DL (ref 8.5–10.1)
CHLORIDE SERPL-SCNC: 100 MMOL/L (ref 100–108)
CO2 SERPL-SCNC: 30 MMOL/L (ref 21–32)
COLOR UR: YELLOW
CREAT SERPL-MCNC: 1 MG/DL (ref 0.6–1.3)
DIFFERENTIAL METHOD BLD: ABNORMAL
EOSINOPHIL # BLD: 0 K/UL (ref 0–0.4)
EOSINOPHIL NFR BLD: 1 % (ref 0–5)
ERYTHROCYTE [DISTWIDTH] IN BLOOD BY AUTOMATED COUNT: 14.8 % (ref 11.6–14.5)
EST. AVERAGE GLUCOSE BLD GHB EST-MCNC: 103 MG/DL
GLOBULIN SER CALC-MCNC: 4.1 G/DL (ref 2–4)
GLUCOSE SERPL-MCNC: 80 MG/DL (ref 74–99)
GLUCOSE UR STRIP.AUTO-MCNC: NEGATIVE MG/DL
HBA1C MFR BLD: 5.2 % (ref 4.2–5.6)
HCT VFR BLD AUTO: 36.9 % (ref 35–45)
HGB BLD-MCNC: 11.7 G/DL (ref 12–16)
HGB UR QL STRIP: NEGATIVE
KETONES UR QL STRIP.AUTO: NEGATIVE MG/DL
LEUKOCYTE ESTERASE UR QL STRIP.AUTO: NEGATIVE
LYMPHOCYTES # BLD: 1.5 K/UL (ref 0.9–3.6)
LYMPHOCYTES NFR BLD: 19 % (ref 21–52)
MCH RBC QN AUTO: 27.8 PG (ref 24–34)
MCHC RBC AUTO-ENTMCNC: 31.7 G/DL (ref 31–37)
MCV RBC AUTO: 87.6 FL (ref 74–97)
MONOCYTES # BLD: 0.8 K/UL (ref 0.05–1.2)
MONOCYTES NFR BLD: 11 % (ref 3–10)
NEUTS SEG # BLD: 5.5 K/UL (ref 1.8–8)
NEUTS SEG NFR BLD: 68 % (ref 40–73)
NITRITE UR QL STRIP.AUTO: NEGATIVE
PH UR STRIP: 7.5 [PH] (ref 5–8)
PLATELET # BLD AUTO: 169 K/UL (ref 135–420)
PMV BLD AUTO: 13.4 FL (ref 9.2–11.8)
POTASSIUM SERPL-SCNC: 3.6 MMOL/L (ref 3.5–5.5)
PROT SERPL-MCNC: 7.8 G/DL (ref 6.4–8.2)
PROT UR STRIP-MCNC: NEGATIVE MG/DL
RBC # BLD AUTO: 4.21 M/UL (ref 4.2–5.3)
SODIUM SERPL-SCNC: 139 MMOL/L (ref 136–145)
SP GR UR REFRACTOMETRY: 1.01 (ref 1–1.03)
T4 FREE SERPL-MCNC: 1.1 NG/DL (ref 0.7–1.5)
TSH SERPL DL<=0.05 MIU/L-ACNC: 4.3 UIU/ML (ref 0.36–3.74)
UROBILINOGEN UR QL STRIP.AUTO: 0.2 EU/DL (ref 0.2–1)
WBC # BLD AUTO: 7.9 K/UL (ref 4.6–13.2)

## 2018-03-13 PROCEDURE — 80053 COMPREHEN METABOLIC PANEL: CPT | Performed by: FAMILY MEDICINE

## 2018-03-13 PROCEDURE — 84439 ASSAY OF FREE THYROXINE: CPT | Performed by: FAMILY MEDICINE

## 2018-03-13 PROCEDURE — 87086 URINE CULTURE/COLONY COUNT: CPT | Performed by: FAMILY MEDICINE

## 2018-03-13 PROCEDURE — 85025 COMPLETE CBC W/AUTO DIFF WBC: CPT | Performed by: FAMILY MEDICINE

## 2018-03-13 PROCEDURE — 84443 ASSAY THYROID STIM HORMONE: CPT | Performed by: FAMILY MEDICINE

## 2018-03-13 PROCEDURE — 83036 HEMOGLOBIN GLYCOSYLATED A1C: CPT | Performed by: FAMILY MEDICINE

## 2018-03-13 PROCEDURE — 36415 COLL VENOUS BLD VENIPUNCTURE: CPT | Performed by: FAMILY MEDICINE

## 2018-03-13 PROCEDURE — 81003 URINALYSIS AUTO W/O SCOPE: CPT | Performed by: FAMILY MEDICINE

## 2018-03-14 DIAGNOSIS — D64.9 LOW HEMOGLOBIN: Primary | ICD-10-CM

## 2018-03-15 ENCOUNTER — TELEPHONE (OUTPATIENT)
Dept: FAMILY MEDICINE CLINIC | Age: 83
End: 2018-03-15

## 2018-03-15 LAB
BACTERIA SPEC CULT: ABNORMAL
SERVICE CMNT-IMP: ABNORMAL

## 2018-03-15 NOTE — TELEPHONE ENCOUNTER
----- Message from Basilio Voss MD sent at 3/14/2018  8:18 AM EDT -----  HgbA1c is now normal.  Urine is clear. Hemoglobin was borderline low. Likely nutritional.  Please make sure that there is no bleeding.   Will check FIT test

## 2018-03-16 NOTE — TELEPHONE ENCOUNTER
Spoke with patient's son. Discussed lab results. He will come in to  a FIT kit at his earliest convenience. Closing encounter.

## 2018-04-03 ENCOUNTER — HOSPITAL ENCOUNTER (OUTPATIENT)
Dept: LAB | Age: 83
Discharge: HOME | End: 2018-04-03
Payer: MEDICARE

## 2018-04-03 DIAGNOSIS — D64.9 LOW HEMOGLOBIN: ICD-10-CM

## 2018-04-03 PROCEDURE — 82274 ASSAY TEST FOR BLOOD FECAL: CPT | Performed by: FAMILY MEDICINE

## 2018-04-09 LAB — HEMOCCULT STL QL IA: NEGATIVE

## 2018-07-05 ENCOUNTER — DOCUMENTATION ONLY (OUTPATIENT)
Dept: FAMILY MEDICINE CLINIC | Age: 83
End: 2018-07-05

## 2018-07-06 NOTE — PROGRESS NOTES
Her son called @ 6:30 PM today because she had loose BM x 3 today. No fever, blood in stool, change in her HILL symptoms (pain, vomiting). Advised warning signs. Advised to push clear fluids and advance to diarrhea diet as tolerated. To be seen by MD if symptoms do not promptly resolve or worsen. He voiced understanding.

## 2018-09-18 RX ORDER — RANITIDINE 150 MG/1
150 TABLET, FILM COATED ORAL 2 TIMES DAILY
Qty: 60 TAB | Refills: 1 | Status: SHIPPED | OUTPATIENT
Start: 2018-09-18 | End: 2019-05-08

## 2018-09-18 NOTE — TELEPHONE ENCOUNTER
Dr. Les Lewis, please see refill request for patient, thank you! Requested Prescriptions     Pending Prescriptions Disp Refills    raNITIdine (ZANTAC) 150 mg tablet 60 Tab 1     Sig: Take 1 Tab by mouth two (2) times a day.

## 2018-09-18 NOTE — TELEPHONE ENCOUNTER
Requested Prescriptions     Pending Prescriptions Disp Refills    raNITIdine (ZANTAC) 150 mg tablet 60 Tab 1     Sig: Take 1 Tab by mouth two (2) times a day. [address]    They have 3 tablets remaining. Pts last appt was 3/18/18, no future appointment is scheduled. Pt aware of 72 hours time frame.

## 2018-12-12 ENCOUNTER — OFFICE VISIT (OUTPATIENT)
Dept: FAMILY MEDICINE CLINIC | Age: 83
End: 2018-12-12

## 2018-12-12 VITALS
OXYGEN SATURATION: 97 % | BODY MASS INDEX: 18.16 KG/M2 | WEIGHT: 109 LBS | SYSTOLIC BLOOD PRESSURE: 110 MMHG | DIASTOLIC BLOOD PRESSURE: 54 MMHG | RESPIRATION RATE: 16 BRPM | HEIGHT: 65 IN | HEART RATE: 82 BPM | TEMPERATURE: 97.7 F

## 2018-12-12 DIAGNOSIS — H15.009 SCLERATITIS, UNSPECIFIED LATERALITY: ICD-10-CM

## 2018-12-12 DIAGNOSIS — M25.441 SWELLING OF JOINT OF RIGHT HAND: Primary | ICD-10-CM

## 2018-12-12 PROBLEM — E11.21 TYPE 2 DIABETES WITH NEPHROPATHY (HCC): Status: ACTIVE | Noted: 2018-12-12

## 2018-12-12 NOTE — PROGRESS NOTES
Pradip Duvall is a 80 y.o. female (: 1925) presenting to address:    Chief Complaint   Patient presents with    Hand Swelling     right hand swelling x2 days       Vitals:    18 1637   BP: 110/54   Pulse: 82   Resp: 16   Temp: 97.7 °F (36.5 °C)   SpO2: 97%   Weight: 109 lb (49.4 kg)   Height: 5' 5\" (1.651 m)       Hearing/Vision:   No exam data present    Learning Assessment:     Learning Assessment 2016   PRIMARY LEARNER Patient   HIGHEST LEVEL OF EDUCATION - PRIMARY LEARNER  4 YEARS OF COLLEGE   BARRIERS PRIMARY LEARNER COGNITIVE   CO-LEARNER CAREGIVER Yes   CO-LEARNER NAME Daughter/Son   CO-LEARNER HIGHEST LEVEL OF EDUCATION SOME COLLEGE   BARRIERS CO-LEARNER NONE   PRIMARY LANGUAGE ENGLISH   PRIMARY LANGUAGE CO-LEARNER ENGLISH    NEED No   LEARNER PREFERENCE PRIMARY DEMONSTRATION     READING   LEARNER PREFERENCE CO-LEARNER DEMONSTRATION   LEARNING SPECIAL TOPICS no   ANSWERED BY patient   RELATIONSHIP SELF     Depression Screening:     PHQ over the last two weeks 3/14/2017   Little interest or pleasure in doing things Not at all   Feeling down, depressed, irritable, or hopeless Several days   Total Score PHQ 2 1     Fall Risk Assessment:     Fall Risk Assessment, last 12 mths 2018   Able to walk? Yes   Fall in past 12 months? No   Fall with injury? -   Number of falls in past 12 months -   Fall Risk Score -     Abuse Screening:     Abuse Screening Questionnaire 1/15/2018   Do you ever feel afraid of your partner? N   Are you in a relationship with someone who physically or mentally threatens you? N   Is it safe for you to go home? Y     Coordination of Care Questionaire:   1. Have you been to the ER, urgent care clinic since your last visit? Hospitalized since your last visit? NO    2. Have you seen or consulted any other health care providers outside of the [address] [place] [address] since your last visit? Include any pap smears or colon screening.  NO

## 2018-12-12 NOTE — PROGRESS NOTES
HISTORY OF PRESENT ILLNESS  Ha Hartman is a 80 y.o. female for evaluation of swollen MIP joints on right hand. Swelling occurred last night. She denies trauma. Son has also noted some reddening of her sclera. Hand Swelling    The history is provided by the patient and relative. This is a new problem. The problem has not changed since onset. The pain is present in the right hand. The pain is at a severity of 4/10. The pain is moderate. Pertinent negatives include no numbness, full range of motion, no stiffness and no tingling. The symptoms are aggravated by movement and palpation. She has tried nothing for the symptoms. There has been no history of extremity trauma. Allergies   Allergen Reactions    Shellfish Derived Anaphylaxis    Salonpas-Hot [Capsaicin] Rash    Iodine Not Reported This Time    Oxycodone-Acetaminophen Other (comments)     Son states, \"it makes her crazy\"      Current Outpatient Medications on File Prior to Visit   Medication Sig Dispense Refill    raNITIdine (ZANTAC) 150 mg tablet Take 1 Tab by mouth two (2) times a day. 60 Tab 1    multivitamin (ONE A DAY) tablet Take 1 Tab by mouth daily.  DOCOSAHEXANOIC ACID/EPA (FISH OIL PO) Take 1 Cap by mouth daily.  ACETAMINOPHEN (TYLENOL PO) Take 325 mg by mouth daily as needed.  ginkgo biloba 120 mg tab Take 1 Tab by mouth daily.  vitamin e (E GEMS) 100 unit capsule Take 100 Units by mouth daily.  Ca-D3-mag-zinc--kristina-boron (CALTRATE 600+D PLUS MINERALS) 600 mg calcium- 800 unit-40 mg chew Take 1 Tab by mouth two (2) times a day. (Patient taking differently: Take 1 Tab by mouth daily.) 60 Tab 3    polyethylene glycol (MIRALAX) 17 gram/dose powder Take 17 g by mouth daily as needed. 510 g 0    GLUCOSAMINE/CHONDROITN/NA/C/SE (JOINT FORMULA PO) Take 1 Cap by mouth daily. No current facility-administered medications on file prior to visit.       Past Medical History:   Diagnosis Date    Back pain     Comminuted left humeral fracture 07/2017    DJD (degenerative joint disease)     mostly of knees    DM (diabetes mellitus) (HCC)     Fatigue     GERD (gastroesophageal reflux disease)     w/chronic pulmonary fibrosis    Herpes zoster     Lumbar spondylosis     LBP    Motion sickness     chronic    PVCs (premature ventricular contractions)     recurrent    Varicose veins     Venous insufficiency     Lt LE     No past surgical history on file. No family history on file. Social History     Socioeconomic History    Marital status: UNKNOWN     Spouse name: Not on file    Number of children: 4    Years of education: 12    Highest education level: Not on file   Social Needs    Financial resource strain: Not on file    Food insecurity - worry: Not on file    Food insecurity - inability: Not on file   [employer] needs - medical: Not on file   [employer] needs - non-medical: Not on file   Occupational History    Occupation: RN     Employer: RETIRED   Tobacco Use    Smoking status: Never Smoker    Smokeless tobacco: Never Used   Substance and Sexual Activity    Alcohol use: No     Alcohol/week: 0.0 oz    Drug use: No    Sexual activity: Not Currently   Other Topics Concern     Service No    Blood Transfusions No    Caffeine Concern No    Occupational Exposure No    Hobby Hazards No    Sleep Concern No    Stress Concern No    Weight Concern No    Special Diet No    Back Care No    Exercise No    Bike Helmet No    Seat Belt Yes    Self-Exams No   Social History Narrative    Not on file         Review of Systems   Constitutional: Negative. Eyes: Negative. Respiratory: Negative. Cardiovascular: Negative. Musculoskeletal: Positive for joint pain. Negative for stiffness. There is pain and tenderness of the first and second MIP joint on the right hand     Neurological: Negative. Negative for tingling and numbness. Endo/Heme/Allergies: Negative. Psychiatric/Behavioral: Negative. Visit Vitals  /54 (BP 1 Location: Left arm, BP Patient Position: Sitting)   Pulse 82   Temp 97.7 °F (36.5 °C)   Resp 16   Ht 5' 5\" (1.651 m)   Wt 109 lb (49.4 kg)   SpO2 97%   BMI 18.14 kg/m²       Physical Exam   Constitutional: She is oriented to person, place, and time. She appears well-developed and well-nourished. HENT:   Head: Normocephalic and atraumatic. Cardiovascular: Normal rate, regular rhythm, normal heart sounds and intact distal pulses. Exam reveals no gallop and no friction rub. No murmur heard. Pulmonary/Chest: Effort normal and breath sounds normal. No respiratory distress. She has no wheezes. She has no rales. Musculoskeletal: She exhibits edema, tenderness and deformity. There is swelling and tenderness of the first and second MIP joints on the right hand. Neurological: She is alert and oriented to person, place, and time. No cranial nerve deficit. Coordination normal.   Skin: Skin is warm and dry. No rash noted. No erythema. No pallor. Psychiatric: She has a normal mood and affect. Her behavior is normal. Judgment and thought content normal.   Nursing note and vitals reviewed. ASSESSMENT and PLAN    ICD-10-CM ICD-9-CM    1. Swelling of joint of right hand M25.441 719.04 XR HAND RT AP/LAT      URIC ACID   2. Scleratitis, unspecified laterality H15.009 379.00 REFERRAL TO OPHTHALMOLOGY     Follow-up Disposition:  Return if symptoms worsen or fail to improve. Advised to use Tylenol arthritis for hand pain.

## 2018-12-13 ENCOUNTER — HOSPITAL ENCOUNTER (OUTPATIENT)
Dept: LAB | Age: 83
Discharge: HOME | End: 2018-12-13

## 2018-12-13 PROCEDURE — 99001 SPECIMEN HANDLING PT-LAB: CPT

## 2018-12-14 LAB — URATE SERPL-MCNC: 3.9 MG/DL (ref 2.5–7.1)

## 2018-12-28 ENCOUNTER — TELEPHONE (OUTPATIENT)
Dept: FAMILY MEDICINE CLINIC | Age: 83
End: 2018-12-28

## 2018-12-28 NOTE — TELEPHONE ENCOUNTER
----- Message from Carissa Gutierrez MD sent at 12/17/2018  9:13 AM EST -----  Uric acid is less than 6.   She probably has osteoarthritis and not gout

## 2019-01-01 ENCOUNTER — TELEPHONE (OUTPATIENT)
Dept: FAMILY MEDICINE CLINIC | Age: 84
End: 2019-01-01

## 2019-01-01 ENCOUNTER — DOCUMENTATION ONLY (OUTPATIENT)
Dept: FAMILY MEDICINE CLINIC | Age: 84
End: 2019-01-01

## 2019-01-01 ENCOUNTER — HOME CARE VISIT (OUTPATIENT)
Dept: SCHEDULING | Facility: HOME HEALTH | Age: 84
End: 2019-01-01
Payer: MEDICARE

## 2019-01-01 ENCOUNTER — OFFICE VISIT (OUTPATIENT)
Dept: FAMILY MEDICINE CLINIC | Age: 84
End: 2019-01-01

## 2019-01-01 VITALS
TEMPERATURE: 96.4 F | WEIGHT: 106 LBS | BODY MASS INDEX: 17.66 KG/M2 | HEART RATE: 66 BPM | DIASTOLIC BLOOD PRESSURE: 60 MMHG | SYSTOLIC BLOOD PRESSURE: 112 MMHG | HEIGHT: 65 IN | RESPIRATION RATE: 14 BRPM

## 2019-01-01 VITALS
HEART RATE: 62 BPM | SYSTOLIC BLOOD PRESSURE: 100 MMHG | TEMPERATURE: 97.3 F | DIASTOLIC BLOOD PRESSURE: 58 MMHG | OXYGEN SATURATION: 94 %

## 2019-01-01 DIAGNOSIS — L89.301 PRESSURE INJURY OF BUTTOCK, STAGE 1, UNSPECIFIED LATERALITY: ICD-10-CM

## 2019-01-01 DIAGNOSIS — I63.9 CEREBELLAR STROKE (HCC): ICD-10-CM

## 2019-01-01 DIAGNOSIS — E11.21 TYPE 2 DIABETES WITH NEPHROPATHY (HCC): Primary | ICD-10-CM

## 2019-01-01 DIAGNOSIS — N39.0 RECURRENT UTI: ICD-10-CM

## 2019-01-01 DIAGNOSIS — F03.90 SENILE DEMENTIA WITHOUT BEHAVIORAL DISTURBANCE (HCC): ICD-10-CM

## 2019-01-01 DIAGNOSIS — R54 ADVANCED AGE: ICD-10-CM

## 2019-01-01 DIAGNOSIS — R26.9 GAIT DIFFICULTY: ICD-10-CM

## 2019-01-01 DIAGNOSIS — R63.4 WEIGHT LOSS: ICD-10-CM

## 2019-01-01 LAB
ALBUMIN SERPL-MCNC: 4.2 G/DL (ref 3.2–4.6)
ALBUMIN/GLOB SERPL: 1.3 {RATIO} (ref 1.2–2.2)
ALP SERPL-CCNC: 53 IU/L (ref 39–117)
ALT SERPL-CCNC: 10 IU/L (ref 0–32)
AST SERPL-CCNC: 19 IU/L (ref 0–40)
BASOPHILS # BLD AUTO: 0.1 X10E3/UL (ref 0–0.2)
BASOPHILS NFR BLD AUTO: 1 %
BILIRUB SERPL-MCNC: 0.4 MG/DL (ref 0–1.2)
BUN SERPL-MCNC: 19 MG/DL (ref 10–36)
BUN/CREAT SERPL: 25 (ref 12–28)
CALCIUM SERPL-MCNC: 10.3 MG/DL (ref 8.7–10.3)
CHLORIDE SERPL-SCNC: 100 MMOL/L (ref 96–106)
CHOLEST SERPL-MCNC: 190 MG/DL (ref 100–199)
CO2 SERPL-SCNC: 26 MMOL/L (ref 20–29)
CREAT SERPL-MCNC: 0.77 MG/DL (ref 0.57–1)
EOSINOPHIL # BLD AUTO: 0.3 X10E3/UL (ref 0–0.4)
EOSINOPHIL NFR BLD AUTO: 4 %
ERYTHROCYTE [DISTWIDTH] IN BLOOD BY AUTOMATED COUNT: 13.1 % (ref 12.3–15.4)
EST. AVERAGE GLUCOSE BLD GHB EST-MCNC: 111 MG/DL
FOLATE SERPL-MCNC: >20 NG/ML
GLOBULIN SER CALC-MCNC: 3.3 G/DL (ref 1.5–4.5)
GLUCOSE SERPL-MCNC: 95 MG/DL (ref 65–99)
HBA1C MFR BLD: 5.5 % (ref 4.8–5.6)
HCT VFR BLD AUTO: 36.8 % (ref 34–46.6)
HDLC SERPL-MCNC: 62 MG/DL
HGB BLD-MCNC: 11.6 G/DL (ref 11.1–15.9)
IMM GRANULOCYTES # BLD AUTO: 0 X10E3/UL (ref 0–0.1)
IMM GRANULOCYTES NFR BLD AUTO: 0 %
INTERPRETATION, 910389: NORMAL
LDLC SERPL CALC-MCNC: 113 MG/DL (ref 0–99)
LYMPHOCYTES # BLD AUTO: 1.9 X10E3/UL (ref 0.7–3.1)
LYMPHOCYTES NFR BLD AUTO: 24 %
MCH RBC QN AUTO: 27.7 PG (ref 26.6–33)
MCHC RBC AUTO-ENTMCNC: 31.5 G/DL (ref 31.5–35.7)
MCV RBC AUTO: 88 FL (ref 79–97)
MONOCYTES # BLD AUTO: 0.7 X10E3/UL (ref 0.1–0.9)
MONOCYTES NFR BLD AUTO: 9 %
NEUTROPHILS # BLD AUTO: 4.8 X10E3/UL (ref 1.4–7)
NEUTROPHILS NFR BLD AUTO: 62 %
PLATELET # BLD AUTO: 185 X10E3/UL (ref 150–450)
POTASSIUM SERPL-SCNC: 4.1 MMOL/L (ref 3.5–5.2)
PROT SERPL-MCNC: 7.5 G/DL (ref 6–8.5)
RBC # BLD AUTO: 4.19 X10E6/UL (ref 3.77–5.28)
SODIUM SERPL-SCNC: 142 MMOL/L (ref 134–144)
TRIGL SERPL-MCNC: 75 MG/DL (ref 0–149)
TSH SERPL DL<=0.005 MIU/L-ACNC: 3.45 UIU/ML (ref 0.45–4.5)
VIT B12 SERPL-MCNC: >2000 PG/ML (ref 232–1245)
VLDLC SERPL CALC-MCNC: 15 MG/DL (ref 5–40)
WBC # BLD AUTO: 7.7 X10E3/UL (ref 3.4–10.8)

## 2019-01-01 PROCEDURE — 3331090002 HH PPS REVENUE DEBIT

## 2019-01-01 PROCEDURE — 3331090001 HH PPS REVENUE CREDIT

## 2019-01-01 PROCEDURE — G0152 HHCP-SERV OF OT,EA 15 MIN: HCPCS

## 2019-01-01 PROCEDURE — G0151 HHCP-SERV OF PT,EA 15 MIN: HCPCS

## 2019-01-01 PROCEDURE — 3331090003 HH PPS REVENUE ADJ

## 2019-01-28 ENCOUNTER — OFFICE VISIT (OUTPATIENT)
Dept: FAMILY MEDICINE CLINIC | Age: 84
End: 2019-01-28

## 2019-01-28 ENCOUNTER — HOSPITAL ENCOUNTER (OUTPATIENT)
Dept: LAB | Age: 84
Discharge: HOME | End: 2019-01-28

## 2019-01-28 VITALS
RESPIRATION RATE: 14 BRPM | WEIGHT: 109 LBS | DIASTOLIC BLOOD PRESSURE: 67 MMHG | HEIGHT: 65 IN | BODY MASS INDEX: 18.16 KG/M2 | SYSTOLIC BLOOD PRESSURE: 132 MMHG | HEART RATE: 56 BPM | TEMPERATURE: 97.3 F | OXYGEN SATURATION: 92 %

## 2019-01-28 DIAGNOSIS — R26.9 GAIT DIFFICULTY: ICD-10-CM

## 2019-01-28 DIAGNOSIS — F03.90 SENILE DEMENTIA WITHOUT BEHAVIORAL DISTURBANCE (HCC): ICD-10-CM

## 2019-01-28 DIAGNOSIS — E11.21 TYPE 2 DIABETES WITH NEPHROPATHY (HCC): Primary | ICD-10-CM

## 2019-01-28 DIAGNOSIS — M54.2 CERVICALGIA: ICD-10-CM

## 2019-01-28 DIAGNOSIS — N39.0 RECURRENT UTI: ICD-10-CM

## 2019-01-28 DIAGNOSIS — R53.1 WEAKNESS: ICD-10-CM

## 2019-01-28 PROCEDURE — 99001 SPECIMEN HANDLING PT-LAB: CPT

## 2019-01-29 LAB
ALBUMIN SERPL-MCNC: 4.1 G/DL (ref 3.2–4.6)
ALBUMIN/GLOB SERPL: 1.2 {RATIO} (ref 1.2–2.2)
ALP SERPL-CCNC: 60 IU/L (ref 39–117)
ALT SERPL-CCNC: 13 IU/L (ref 0–32)
AST SERPL-CCNC: 15 IU/L (ref 0–40)
BASOPHILS # BLD AUTO: 0 X10E3/UL (ref 0–0.2)
BASOPHILS NFR BLD AUTO: 0 %
BILIRUB SERPL-MCNC: 0.3 MG/DL (ref 0–1.2)
BUN SERPL-MCNC: 15 MG/DL (ref 10–36)
BUN/CREAT SERPL: 19 (ref 12–28)
CALCIUM SERPL-MCNC: 9.8 MG/DL (ref 8.7–10.3)
CHLORIDE SERPL-SCNC: 98 MMOL/L (ref 96–106)
CO2 SERPL-SCNC: 26 MMOL/L (ref 20–29)
CREAT SERPL-MCNC: 0.79 MG/DL (ref 0.57–1)
EOSINOPHIL # BLD AUTO: 0.1 X10E3/UL (ref 0–0.4)
EOSINOPHIL NFR BLD AUTO: 2 %
ERYTHROCYTE [DISTWIDTH] IN BLOOD BY AUTOMATED COUNT: 14.4 % (ref 12.3–15.4)
EST. AVERAGE GLUCOSE BLD GHB EST-MCNC: 111 MG/DL
FERRITIN SERPL-MCNC: 209 NG/ML (ref 15–150)
FOLATE SERPL-MCNC: >20 NG/ML
GLOBULIN SER CALC-MCNC: 3.5 G/DL (ref 1.5–4.5)
GLUCOSE SERPL-MCNC: 111 MG/DL (ref 65–99)
HBA1C MFR BLD: 5.5 % (ref 4.8–5.6)
HCT VFR BLD AUTO: 36.6 % (ref 34–46.6)
HGB BLD-MCNC: 11.6 G/DL (ref 11.1–15.9)
IMM GRANULOCYTES # BLD AUTO: 0 X10E3/UL (ref 0–0.1)
IMM GRANULOCYTES NFR BLD AUTO: 0 %
IRON SATN MFR SERPL: 48 % (ref 15–55)
IRON SERPL-MCNC: 108 UG/DL (ref 27–139)
LYMPHOCYTES # BLD AUTO: 1.7 X10E3/UL (ref 0.7–3.1)
LYMPHOCYTES NFR BLD AUTO: 21 %
MCH RBC QN AUTO: 27.6 PG (ref 26.6–33)
MCHC RBC AUTO-ENTMCNC: 31.7 G/DL (ref 31.5–35.7)
MCV RBC AUTO: 87 FL (ref 79–97)
MONOCYTES # BLD AUTO: 0.8 X10E3/UL (ref 0.1–0.9)
MONOCYTES NFR BLD AUTO: 10 %
NEUTROPHILS # BLD AUTO: 5.5 X10E3/UL (ref 1.4–7)
NEUTROPHILS NFR BLD AUTO: 67 %
PLATELET # BLD AUTO: 183 X10E3/UL (ref 150–379)
POTASSIUM SERPL-SCNC: 4.6 MMOL/L (ref 3.5–5.2)
PROT SERPL-MCNC: 7.6 G/DL (ref 6–8.5)
RBC # BLD AUTO: 4.2 X10E6/UL (ref 3.77–5.28)
SODIUM SERPL-SCNC: 140 MMOL/L (ref 134–144)
TIBC SERPL-MCNC: 227 UG/DL (ref 250–450)
TSH SERPL DL<=0.005 MIU/L-ACNC: 3.71 UIU/ML (ref 0.45–4.5)
UIBC SERPL-MCNC: 119 UG/DL (ref 118–369)
VIT B12 SERPL-MCNC: 1331 PG/ML (ref 232–1245)
WBC # BLD AUTO: 8.1 X10E3/UL (ref 3.4–10.8)

## 2019-01-29 NOTE — PROGRESS NOTES
Her complete blood count was normal.  Her iron profile did not show any iron deficiency. Her vitamin B12 and folate also did not show any deficiency. Her metabolic panel was normal except for borderline high glucose.  
Hemoglobin A1c for diabetes was normal. 
Her thyroid test was normal.

## 2019-02-02 LAB
APPEARANCE UR: CLEAR
BACTERIA UR CULT: ABNORMAL
BACTERIA UR CULT: ABNORMAL
BILIRUB UR QL STRIP: NEGATIVE
COLOR UR: YELLOW
GLUCOSE UR QL: NEGATIVE
HGB UR QL STRIP: NEGATIVE
KETONES UR QL STRIP: NEGATIVE
LEUKOCYTE ESTERASE UR QL STRIP: NEGATIVE
MICRO URNS: NORMAL
NITRITE UR QL STRIP: NEGATIVE
PH UR STRIP: 7 [PH] (ref 5–7.5)
PROT UR QL STRIP: NEGATIVE
SP GR UR: 1.01 (ref 1–1.03)
UROBILINOGEN UR STRIP-MCNC: 0.2 MG/DL (ref 0.2–1)

## 2019-02-05 NOTE — PROGRESS NOTES
Urine analysis was normal, which indicates that she does not have an UTI. Her urine culture shows 2 bacteria in the low count, which is considered colonization or contaminant, since urine analysis is normal.  If patient becomes symptomatic we will need to reculture.

## 2019-02-11 ENCOUNTER — TELEPHONE (OUTPATIENT)
Dept: FAMILY MEDICINE CLINIC | Age: 84
End: 2019-02-11

## 2019-02-11 NOTE — TELEPHONE ENCOUNTER
Called and spoke to patient's son, Bruno Lee. Advised him of the lab results. He states his mother is more resistant to care when soiling herself, she has to be fed dinner or she wont eat, she is sleeping more as well. He would like to bring a urine in tomorrow for reculture.

## 2019-02-11 NOTE — TELEPHONE ENCOUNTER
----- Message from Catherine Bowie MD sent at 2/5/2019  2:32 PM EST -----  Urine analysis was normal, which indicates that she does not have an UTI. Her urine culture shows 2 bacteria in the low count, which is considered colonization or contaminant, since urine analysis is normal.  If patient becomes symptomatic we will need to reculture.

## 2019-05-08 ENCOUNTER — OFFICE VISIT (OUTPATIENT)
Dept: FAMILY MEDICINE CLINIC | Age: 84
End: 2019-05-08

## 2019-05-08 VITALS
OXYGEN SATURATION: 98 % | HEART RATE: 73 BPM | BODY MASS INDEX: 19.16 KG/M2 | SYSTOLIC BLOOD PRESSURE: 114 MMHG | WEIGHT: 115 LBS | HEIGHT: 65 IN | RESPIRATION RATE: 18 BRPM | DIASTOLIC BLOOD PRESSURE: 58 MMHG | TEMPERATURE: 96.4 F

## 2019-05-08 DIAGNOSIS — R53.1 WEAKNESS: ICD-10-CM

## 2019-05-08 DIAGNOSIS — R26.9 GAIT DIFFICULTY: ICD-10-CM

## 2019-05-08 DIAGNOSIS — R54 ADVANCED AGE: ICD-10-CM

## 2019-05-08 DIAGNOSIS — K21.9 GASTROESOPHAGEAL REFLUX DISEASE, ESOPHAGITIS PRESENCE NOT SPECIFIED: ICD-10-CM

## 2019-05-08 DIAGNOSIS — E11.21 TYPE 2 DIABETES WITH NEPHROPATHY (HCC): ICD-10-CM

## 2019-05-08 DIAGNOSIS — Z00.00 ENCOUNTER FOR MEDICARE ANNUAL WELLNESS EXAM: Primary | ICD-10-CM

## 2019-05-08 DIAGNOSIS — Z71.89 ACP (ADVANCE CARE PLANNING): ICD-10-CM

## 2019-05-08 DIAGNOSIS — N39.0 RECURRENT UTI: ICD-10-CM

## 2019-05-08 DIAGNOSIS — L89.301 PRESSURE INJURY OF BUTTOCK, STAGE 1, UNSPECIFIED LATERALITY: ICD-10-CM

## 2019-05-08 DIAGNOSIS — F03.90 SENILE DEMENTIA WITHOUT BEHAVIORAL DISTURBANCE (HCC): ICD-10-CM

## 2019-05-08 RX ORDER — RANITIDINE 15 MG/ML
150 SYRUP ORAL 2 TIMES DAILY
Qty: 473 ML | Refills: 3 | Status: SHIPPED | OUTPATIENT
Start: 2019-05-08 | End: 2020-01-01

## 2019-05-08 NOTE — PATIENT INSTRUCTIONS
Medicare Wellness Visit, Female The best way to live healthy is to have a lifestyle where you eat a well-balanced diet, exercise regularly, limit alcohol use, and quit all forms of tobacco/nicotine, if applicable. Regular preventive services are another way to keep healthy. Preventive services (vaccines, screening tests, monitoring & exams) can help personalize your care plan, which helps you manage your own care. Screening tests can find health problems at the earliest stages, when they are easiest to treat. [facility] follows the current, evidence-based guidelines published by the [facility] ([facility]) when recommending preventive services for our patients. Because we follow these guidelines, sometimes recommendations change over time as research supports it. (For example, mammograms used to be recommended annually. Even though Medicare will still pay for an annual mammogram, the newer guidelines recommend a mammogram every two years for women of average risk.) Of course, you and your doctor may decide to screen more often for some diseases, based on your risk and your health status. Preventive services for you include: - Medicare offers their members a free annual wellness visit, which is time for you and your primary care provider to discuss and plan for your preventive service needs. Take advantage of this benefit every year! 
-All adults over the age of 72 should receive the recommended pneumonia vaccines. Current USPSTF guidelines recommend a series of two vaccines for the best pneumonia protection.  
-All adults should have a flu vaccine yearly and a tetanus vaccine every 10 years. All adults age 61 and older should receive a shingles vaccine once in their lifetime.   
-A bone mass density test is recommended when a woman turns 65 to screen for osteoporosis. This test is only recommended one time, as a screening. Some providers will use this same test as a disease monitoring tool if you already have osteoporosis. -All adults age 38-68 who are overweight should have a diabetes screening test once every three years.  
-Other screening tests and preventive services for persons with diabetes include: an eye exam to screen for diabetic retinopathy, a kidney function test, a foot exam, and stricter control over your cholesterol.  
-Cardiovascular screening for adults with routine risk involves an electrocardiogram (ECG) at intervals determined by your doctor.  
-Colorectal cancer screenings should be done for adults age 54-65 with no increased risk factors for colorectal cancer. There are a number of acceptable methods of screening for this type of cancer. Each test has its own benefits and drawbacks. Discuss with your doctor what is most appropriate for you during your annual wellness visit. The different tests include: colonoscopy (considered the best screening method), a fecal occult blood test, a fecal DNA test, and sigmoidoscopy. -Breast cancer screenings are recommended every other year for women of normal risk, age 54-69. 
-Cervical cancer screenings for women over age 72 are only recommended with certain risk factors.  
-All adults born between [facility] should be screened once for Hepatitis C. Here is a list of your current Health Maintenance items (your personalized list of preventive services) with a due date: 
Health Maintenance Due Topic Date Due [facility]  01/01/1935  Shingles Vaccine (1 of 2) 01/01/1975  Pneumococcal Vaccine (1 of 2 - PCV13) 01/01/1990  
 Diabetic Foot Care  03/14/2018 [facility] Annual Well Visit  03/15/2018  Albumin Urine Test  03/15/2018  Cholesterol Test   06/30/2018 Preventing Falls: Care Instructions Your Care Instructions Getting around your home safely can be a challenge if you have injuries or health problems that make it easy for you to fall. Loose rugs and furniture in walkways are among the dangers for many older people who have problems walking or who have poor eyesight. People who have conditions such as arthritis, osteoporosis, or dementia also have to be careful not to fall. You can make your home safer with a few simple measures. Follow-up care is a key part of your treatment and safety. Be sure to make and go to all appointments, and call your doctor if you are having problems. It's also a good idea to know your test results and keep a list of the medicines you take. How can you care for yourself at home? Taking care of yourself · You may get dizzy if you do not drink enough water. To prevent dehydration, drink plenty of fluids, enough so that your urine is light yellow or clear like water. Choose water and other caffeine-free clear liquids. If you have kidney, heart, or liver disease and have to limit fluids, talk with your doctor before you increase the amount of fluids you drink. · Exercise regularly to improve your strength, muscle tone, and balance. Walk if you can. Swimming may be a good choice if you cannot walk easily. · Have your vision and hearing checked each year or any time you notice a change. If you have trouble seeing and hearing, you might not be able to avoid objects and could lose your balance. · Know the side effects of the medicines you take. Ask your doctor or pharmacist whether the medicines you take can affect your balance. Sleeping pills or sedatives can affect your balance. · Limit the amount of alcohol you drink. Alcohol can impair your balance and other senses. · Ask your doctor whether calluses or corns on your feet need to be removed. If you wear loose-fitting shoes because of calluses or corns, you can lose your balance and fall. · Talk to your doctor if you have numbness in your feet. Preventing falls at home · Remove raised doorway thresholds, throw rugs, and clutter. Repair loose carpet or raised areas in the floor. · Move furniture and electrical cords to keep them out of walking paths. · Use nonskid floor wax, and wipe up spills right away, especially on ceramic tile floors. · If you use a walker or cane, put rubber tips on it. If you use crutches, clean the bottoms of them regularly with an abrasive pad, such as steel wool. · Keep your house well lit, especially [place], and outside walkways. Use night-lights in areas such as hallways and bathrooms. Add extra light switches or use remote switches (such as switches that go on or off when you clap your hands) to make it easier to turn lights on if you have to get up during the night. · Install sturdy handrails on stairways. · Move items in your cabinets so that the things you use a lot are on the lower shelves (about waist level). · Keep a cordless phone and a flashlight with new batteries by your bed. If possible, put a phone in each of the main rooms of your house, or carry a cell phone in case you fall and cannot reach a phone. Or, you can wear a device around your neck or wrist. You push a button that sends a signal for help. · Wear low-heeled shoes that fit well and give your feet good support. Use footwear with nonskid soles. Check the heels and soles of your shoes for wear. Repair or replace worn heels or soles. · Do not wear socks without shoes on wood floors. · Walk on the grass when the sidewalks are slippery. If you live in an area that gets snow and ice in the winter, sprinkle salt on slippery steps and sidewalks. Preventing falls in the bath · Install grab bars and nonskid mats inside and outside your shower or tub and near the toilet and sinks. · Use shower chairs and bath benches. · Use a hand-held shower head that will allow you to sit while showering.  
· Get into a tub or shower by putting the weaker leg in first. Get out of a tub or shower with your strong side first. 
· Repair loose toilet seats and consider installing a raised toilet seat to make getting on and off the toilet easier. · Keep your bathroom door unlocked while you are in the shower. Where can you learn more? Go to http://kevin-danny.info/. Enter [id number] [id number] in the search box to learn more about \"Preventing Falls: Care Instructions. \" Current as of: May 12, 2017 Content Version: 11.4 © 6297-3227 [employer]. Care instructions adapted under license by [employer] (which disclaims liability or warranty for this information). If you have questions about a medical condition or this instruction, always ask your healthcare professional. [address] any warranty or liability for your use of this information. Preventing Outdoor Falls: Care Instructions Your Care Instructions Worries about falls don't need to keep you indoors. Outdoor activities like walking have big benefits for your health. You will need to watch your step and learn a few safety measures. If you are worried about having a fall outdoors, ask your doctor about exercises, classes, or physical therapy that may help. You can learn ways to gain strength, flexibility, and balance. Ask if it might help to use a cane or walker. You can make your time outdoors safer with a few simple measures. Follow-up care is a key part of your treatment and safety. Be sure to make and go to all appointments, and call your doctor if you are having problems. It's also a good idea to know your test results and keep a list of the medicines you take. How can you prevent falls outdoors? · Wear shoes with firm soles and low heels. If you have to walk on an icy surface, use grippers that can be worn over your shoes in bad weather. · Be extra careful if weather is bad.  Walk on the grass when the sidewalks are slick. If you live in a place that gets snow and ice in the winter, sprinkle salt on slippery stairs and sidewalks. · Be careful getting on or off buses and trains or getting in and out of cars. If handrails are available, use them. · Be careful when you cross the street. Look for crosswalks or places where curb cuts or ramps are present. · Try not to hurry, especially if you are carrying something. · Be cautious in parking lots or garages. There may be curbs or changes in pavement, or the height of the pavement may vary. · Make sure to wear the correct eyeglasses, if you need them. Reading glasses or bifocals can make it harder to see hazards that might be in your way. · If you are walking outdoors for exercise, try to: 
¨ Walk in well-lighted, well-maintained areas. These include high school or college tracks, shopping malls, and public spaces. ¨ Walk with a partner. ¨ Watch out for cracked sidewalks, curbs, changes in the height of the pavement, exposed tree roots, and debris such as fallen leaves or branches. Where can you learn more? Go to http://kevin-danny.info/. Enter U678 in the search box to learn more about \"Preventing Outdoor Falls: Care Instructions. \" Current as of: May 12, 2017 Content Version: 11.4 © 6430-2991 [employer]. Care instructions adapted under license by [employer] (which disclaims liability or warranty for this information). If you have questions about a medical condition or this instruction, always ask your healthcare professional. [place] [address] any warranty or liability for your use of this information. How to Get Up Safely After a Fall: Care Instructions Your Care Instructions If you have injuries, health problems, or other reasons that may make it easy for you to fall at home, it is a good idea to learn how to get up safely after a fall.  Learning how to get up correctly can help you avoid making an injury worse. Also, knowing what to do if you cannot get up can help you stay safe until help arrives. Follow-up care is a key part of your treatment and safety. Be sure to make and go to all appointments, and call your doctor if you are having problems. It's also a good idea to know your test results and keep a list of the medicines you take. How can you care for yourself after a fall? If you think you can get up First lie still for a few minutes and think about how you feel. If your body feels okay and you think you can get up safely, follow the rest of the steps below: 1. Look for a chair or other piece of furniture that is close to you. 2. Roll onto your side and rest. Roll by turning your head in the direction you want to roll, move your shoulder and arm, then hip and leg in the same direction. 3. Lie still for a moment to let your blood pressure adjust. 
4. Slowly push your upper body up, lift your head, and take a moment to rest. 
5. Slowly get up on your hands and knees, and crawl to the chair or other stable piece of furniture. 6. Put your hands on the chair. 7. Move one foot forward, and place it flat on the floor. Your other leg should be bent with the knee on the floor. 8. Rise slowly, turn your body, and sit in the chair. Stay seated for a bit and think about how you feel. Call for help. Even if you feel okay, let someone know what happened to you. You might not know that you have a serious injury. If you cannot get up 1. If you think you are injured after a fall or you cannot get up, try not to panic. 2. Call out for help. 3. If you have a phone within reach or you have an emergency call device, use it to call for help. 4. If you do not have a phone within reach, try to slide yourself toward it. If you cannot get to the phone, try to slide toward a door or window or a place where you think you can be heard. 5. [place] or use an object to make noise so someone might hear you. 6. If you can reach something that you can use for a pillow, place it under your head. Try to stay warm by covering yourself with a blanket or clothing while you wait for help. When should you call for help? Call 911 anytime you think you may need emergency care. For example, call if: 
 · You passed out (lost consciousness). · You cannot get up after a fall. · You have severe pain. Call your doctor now or seek immediate medical care if: 
 · You have new or worse pain. · You are dizzy or lightheaded. · You hit your head. Watch closely for changes in your health, and be sure to contact your doctor if: 
 · You do not get better as expected. Where can you learn more? Go to http://kevin-danny.info/. Enter B206 in the search box to learn more about \"How to Get Up Safely After a Fall: Care Instructions. \" Current as of: May 12, 2017 Content Version: 11.4 © 5407-4876 [employer]. Care instructions adapted under license by [employer] (which disclaims liability or warranty for this information). If you have questions about a medical condition or this instruction, always ask your healthcare professional. [address] any warranty or liability for your use of this information. Advance Care Planning: Care Instructions Your Care Instructions It can be hard to live with an illness that cannot be cured. But if your health is getting worse, you may want to make decisions about end-of-life care. Planning for the end of your life does not mean that you are giving up. It is a way to make sure that your wishes are met. Clearly stating your wishes can make it easier for your loved ones. Making plans while you are still able may also ease your mind and make your final days less stressful and more meaningful. Follow-up care is a key part of your treatment and safety.  Be sure to make and go to all appointments, and call your doctor if you are having problems. It's also a good idea to know your test results and keep a list of the medicines you take. What can you do to plan for the end of life? · You can bring these issues up with your doctor. You do not need to wait until your doctor starts the conversation. You might start with \"I would not be willing to live with . Mliss Quevedo Mliss Quevedo Mliss Quevedo \" When you complete this sentence it helps your doctor understand your wishes. · Talk openly and honestly with your doctor. This is the best way to understand the decisions you will need to make as your health changes. Know that you can always change your mind. · Ask your doctor about commonly used life-support measures. These include tube feedings, breathing machines, and fluids given through a vein (IV). Understanding these treatments will help you decide whether you want them. · You may choose to have these life-supporting treatments for a limited time. This allows a trial period to see whether they will help you. You may also decide that you want your doctor to take only certain measures to keep you alive. It is important to spell out these conditions so that your doctor and family understand them. · Talk to your doctor about how long you are likely to live. He or she may be able to give you an idea of what usually happens with your specific illness. · Think about preparing papers that state your wishes. This way there will not be any confusion about what you want. You can change your instructions at any time. Which papers should you prepare? Advance directives are legal papers that tell doctors how you want to be cared for at the end of your life. You do not need a  to write these papers. Ask your doctor or your state health department for information on how to write your advance directives. They may have the forms for each of these types of papers.  Make sure your doctor has a copy of these on file, and give a copy to a family member or close friend. · Consider a do-not-resuscitate order (DNR). This order asks that no extra treatments be done if your heart stops or you stop breathing. Extra treatments may include cardiopulmonary resuscitation (CPR), electrical shock to restart your heart, or a machine to breathe for you. If you decide to have a DNR order, ask your doctor to explain and write it. Place the order in your home where everyone can easily see it. · Consider a living will. A living will explains your wishes about life support and other treatments at the end of your life if you become unable to speak for yourself. Living carlin tell doctors to use or not use treatments that would keep you alive. You must have one or two witnesses or a notary present when you sign this form. · Consider a durable power of  for health care. This allows you to name a person to make decisions about your care if you are not able to. Most people ask a close friend or family member. Talk to this person about the kinds of treatments you want and those that you do not want. Make sure this person understands your wishes. These legal papers are simple to change. Tell your doctor what you want to change, and ask him or her to make a note in your medical file. Give your family updated copies of the papers. Where can you learn more? Go to http://kevin-danny.info/. Enter P184 in the search box to learn more about \"Advance Care Planning: Care Instructions. \" Current as of: September 24, 2016 Content Version: 11.4 © 9181-9935 [employer]. Care instructions adapted under license by [employer] (which disclaims liability or warranty for this information). If you have questions about a medical condition or this instruction, always ask your healthcare professional. [address] any warranty or liability for your use of this information. Learning About Durable Power of  for Health Care What is a durable power of  for health care? A durable power of  for health care is one type of the legal forms called advance directives. It lets you decide who you want to make treatment decisions for you if you cannot speak or decide for yourself. The person you choose is called your health care agent. Another type of advance directive is a living will. It lets you write down what kinds of treatment or life support you want or do not want. What should you think about when choosing a health care agent? Choose your health care agent carefully. This person may or may not be a family member. Talk to the person before you make your final decision. Make sure he or she is comfortable with this responsibility. It's a good idea to choose someone who: · Is at least 25years old. · Knows you well and understands what makes life meaningful for you. · Understands your Jainism and moral values. · Will do what you want, not what he or she wants. · Will be able to make difficult choices at a stressful time. · Will be able to refuse or stop treatment, if that is what you would want, even if you could die. · Will be firm and confident with health professionals if needed. · Will ask questions to get necessary information. · Lives near you or agrees to travel to you if needed. Your family may help you make medical decisions while you can still be part of that process. But it is important to choose one person to be your health care agent in case you are not able to make decisions for yourself. If you don't fill out the legal form and name a health care agent, the decisions your family can make may be limited. Who will make decisions for you if you do not have a health care agent? If you don't have a health care agent or a living will, your family members may disagree about your medical care.  And then some medical professionals who may not know you as well might have to make decisions for you. In some cases, a  makes the decisions. When you name a health care agent, it is very clear who has the power to make health decisions for you. How do you name a health care agent? You name your health care agent on a legal form. It is usually called a durable power of  for health care. Ask your hospital, state bar association, or office on aging where to find these forms. You must sign the form to make it legal. Some states require you to get the form notarized. This means that a person called a  watches you sign the form and then he or she signs the form. Some states also require that two or more witnesses sign the form. Be sure to tell your family members and doctors who your health care agent is. Keep your forms in a safe place. But make sure that your loved ones know where the forms are. This could be in your desk where you keep other important papers. Make sure your doctor has a copy of your forms. Where can you learn more? Go to http://kevin-danny.info/. Enter [id number] in the search box to learn more about \"Learning About Durable Power of  for [facility]. \" Current as of: September 24, 2016 Content Version: 11.4 © 4938-8041 [employer]. Care instructions adapted under license by [employer] (which disclaims liability or warranty for this information). If you have questions about a medical condition or this instruction, always ask your healthcare professional. [place] [address] any warranty or liability for your use of this information. Deciding About Life-Prolonging Treatment Deciding About Life-Prolonging Treatment What is life-prolonging treatment? There are many kinds of treatment that can help you live longer. These may be needed for only a short time until your illness improves.  Or you may use them over the long term to help keep you alive. Some treatments include the use of: · Medicines to slow the progress of certain diseases, such as heart disease, diabetes, cancer, AIDS, or Alzheimer's disease. · Antibiotics to treat serious infections, such as pneumonia. · Dialysis to clean your blood if your kidneys stop working. · A breathing machine to help you breathe if you can't breathe on your own. This machine pumps air into your lungs through a tube put into your throat. · A feeding tube or an intravenous (IV) line to give you food and fluids if you can't eat or drink. · Cardiopulmonary resuscitation (CPR) to try to restart your heart. The decision to receive treatments that may help you live longer is a personal one. You may want your doctor to do everything possible to keep you alive, even when your chance for recovery is small. Or you may choose to only have care to manage your pain and other symptoms. What are key points about this decision? · If there is a good chance that your illness can be cured or managed, your doctor may advise you to first try available treatments. If these don't work, then you might think about stopping treatment. · If you stop treatment, you will still receive care that focuses on pain relief and comfort. · A decision to stop treatment that keeps you alive does not have to be permanent. You can always change your mind if your health starts to improve. · Even though treatment focuses on helping you live longer, it may cause side effects that can greatly affect your quality of life. And it could affect how you spend time with your family and friends. · If you still have personal goals that you want to pursue, you may want treatment that keeps you alive long enough to reach them. Why might you choose life-prolonging treatment? · There is a good chance that your illness can be cured or managed. · You think you can manage the possible side effects of treatment. · You don't think treatment will get in the way of your quality of life. · You have personal goals that you still want to pursue and achieve. Why might you choose to stop life-prolonging treatment? · Your chance of surviving your illness is very low. · You have tried all possible treatments for your illness, but they have not helped. · You can no longer deal with the side effects of treatment. · You have already met the goals you set out to achieve in your life. Your decision Thinking about the facts and your feelings can help you make a decision that is right for you. Be sure you understand the benefits and risks of your options, and think about what else you need to do before you make the decision. Where can you learn more? Go to http://kevin-danny.info/. Enter X680 in the search box to learn more about \"Deciding About Life-Prolonging Treatment. \" Current as of: September 24, 2016 Content Version: 11.4 © 1064-4653 [email or web address]. Care instructions adapted under license by [employer] (which disclaims liability or warranty for this information). If you have questions about a medical condition or this instruction, always ask your healthcare professional. [address] any warranty or liability for your use of this information. Kavya Ashton 6352 What is a living will? A living will is a legal form you use to write down the kind of care you want at the end of your life. It is used by the health professionals who will treat you if you aren't able to decide for yourself. If you put your wishes in writing, your loved ones and others will know what kind of care you want. They won't need to guess. This can ease your mind and be helpful to others. A living will is not the same as an estate or property will. An estate will explains what you want to happen with your money and property after you die. Is a living will a legal document? A living will is a legal document. Each state has its own laws about living carlin. If you move to another state, make sure that your living will is legal in the state where you now live. Or you might use a universal form that has been approved by many states. This kind of form can sometimes be completed and stored online. Your electronic copy will then be available wherever you have a connection to the Internet. In most cases, doctors will respect your wishes even if you have a form from a different state. · You don't need an  to complete a living will. But legal advice can be helpful if your state's laws are unclear, your health history is complicated, or your family can't agree on what should be in your living will. · You can change your living will at any time. Some people find that their wishes about end-of-life care change as their health changes. · In addition to making a living will, think about completing a medical power of  form. This form lets you name the person you want to make end-of-life treatment decisions for you (your \"health care agent\") if you're not able to. Many hospitals and nursing homes will give you the forms you need to complete a living will and a medical power of . · Your living will is used only if you can't make or communicate decisions for yourself anymore. If you become able to make decisions again, you can accept or refuse any treatment, no matter what you wrote in your living will. · Your state may offer an online registry. This is a place where you can store your living will online so the doctors and nurses who need to treat you can find it right away. What should you think about when creating a living will? Talk about your end-of-life wishes with your family members and your doctor. Let them know what you want. That way the people making decisions for you won't be surprised by your choices. Think about these questions as you make your living will: · Do you know enough about life support methods that might be used? If not, talk to your doctor so you know what might be done if you can't breathe on your own, your heart stops, or you're unable to swallow. · What things would you still want to be able to do after you receive life-support methods? Would you want to be able to walk? To speak? To eat on your own? To live without the help of machines? · If you have a choice, where do you want to be cared for? In your home? At a hospital or nursing home? · Do you want certain Anglican practices performed if you become very ill? · If you have a choice at the end of your life, where would you prefer to die? At home? In a hospital or nursing home? Somewhere else? · Would you prefer to be buried or cremated? · Do you want your organs to be donated after you die? What should you do with your living will? · Make sure that your family members and your health care agent have copies of your living will. · Give your doctor a copy of your living will to keep in your medical record. If you have more than one doctor, make sure that each one has a copy. · You may want to put a copy of your living will where it can be easily found. Where can you learn more? Go to http://kevin-danny.info/. Enter M506 in the search box to learn more about \"Learning About Living Mila. \" Current as of: September 24, 2016 Content Version: 11.4 © 4480-4708 [employer] Care instructions adapted under license by [employer] (which disclaims liability or warranty for this information). If you have questions about a medical condition or this instruction, always ask your healthcare professional. [address] any warranty or liability for your use of this information. Advance Directives: Care Instructions Your Care Instructions An advance directive is a legal way to state your wishes at the end of your life. It tells your family and your doctor what to do if you can no longer say what you want. There are two main types of advance directives. You can change them any time that your wishes change. · A living will tells your family and your doctor your wishes about life support and other treatment. · A durable power of  for health care lets you name a person to make treatment decisions for you when you can't speak for yourself. This person is called a health care agent. If you do not have an advance directive, decisions about your medical care may be made by a doctor or a  who doesn't know you. It may help to think of an advance directive as a gift to the people who care for you. If you have one, they won't have to make tough decisions by themselves. Follow-up care is a key part of your treatment and safety. Be sure to make and go to all appointments, and call your doctor if you are having problems. It's also a good idea to know your test results and keep a list of the medicines you take. How can you care for yourself at home? · Discuss your wishes with your loved ones and your doctor. This way, there are no surprises. · Many states have a unique form. Or you might use a universal form that has been approved by many states. This kind of form can sometimes be completed and stored online. Your electronic copy will then be available wherever you have a connection to the Internet. In most cases, doctors will respect your wishes even if you have a form from a different state. · You don't need a  to do an advance directive. But you may want to get legal advice. · Think about these questions when you prepare an advance directive: ¨ Who do you want to make decisions about your medical care if you are not able to? Many people choose a family member or close friend. ¨ Do you know enough about life support methods that might be used? If not, talk to your doctor so you understand. ¨ What are you most afraid of that might happen? You might be afraid of having pain, losing your independence, or being kept alive by machines. ¨ Where would you prefer to die? Choices include your home, a hospital, or a nursing home. ¨ Would you like to have information about hospice care to support you and your family? ¨ Do you want to donate organs when you die? ¨ Do you want certain Jehovah's witness practices performed before you die? If so, put your wishes in the advance directive. · Read your advance directive every year, and make changes as needed. When should you call for help? Be sure to contact your doctor if you have any questions. Where can you learn more? Go to http://kevin-danny.info/. Enter R264 in the search box to learn more about \"Advance Directives: Care Instructions. \" Current as of: September 24, 2016 Content Version: 11.4 © 6306-8414 [employer]. Care instructions adapted under license by [employer] (which disclaims liability or warranty for this information). If you have questions about a medical condition or this instruction, always ask your healthcare professional. [address] any warranty or liability for your use of this information.

## 2019-05-08 NOTE — ACP (ADVANCE CARE PLANNING)
Advance Care Planning (ACP) Provider Note - Comprehensive Date of ACP Conversation: 05/08/19 Persons included in Conversation:  POA (son) Length of ACP Conversation in minutes:  20 minutes Authorized Decision Maker (If the patient is incapable of making informed decisions): Son (POA) The following General ACP was discussed with:POA (son) - Importance of advance care planning, including choosing a healthcare agent to  communicate patient's healthcare decisions if patient lost the ability to make decisions, such as after a sudden illness or accident. - Understanding of the healthcare agent role was assessed and information provided. - Exploration of values, goals, and preferences if recovery is not expected, even with continued medical treatment in the event of: Imminent death and/or Severe permanent brain injury. \"In these circumstances, what matters most to you? \":  Care focused more on comfort or quality of life. \"What, if any, treatments would you want to avoid? \": Life Prolonging Measures - Opportunity offered to explore how cultural, Jainism, spiritual, or personal beliefs would affect decisions for future care. Review of Existing Advance Directive:not applicable New form given to patient. For Serious or Chronic Illness: The following items were discussed with the patient who verbalized understanding: - Understanding of medical condition.   
 
- Understanding of CPR, goals and expected outcomes, benefits and burdens discussed. - Information on CPR success rates provided (e.g. for CPR in hospital, survival to d/c at two weeks is 22%, for chronically ill or elderly/frail survival is less than 3%); the patient was asked to communicate understanding of information in his/her own words. - Explored fears and concerns regarding CPR or possible outcomes Interventions Provided: 
Recommended completion of Advance Directive form after review of ACP materials and conversation with prospective healthcare agent. Recommended communicating the plan and making copies for the healthcare agent, personal physician, and others as appropriate. Recommended review of completed ACP document annually or upon change in health status. Summary: 
The patient was advised that: She may like to appoint a person as her medical decision maker in the event that she can no longer do so. She may appoint a secondary person also. She is encouraged to make decision at this time re: if she wants life prolonging measures in the event- 
   A) her death is imminent and medical treatment will not help her recover. B) her condition makes her unaware of herself or her surroundings and she cannot interact with others. Patient would like to appoint her Son Kyung Gu as her medical decision maker in the event that she can no longer do so. Phone number is [phone]. Her secondary person is her son Coleen Fernando. Phone number is ???. If her death is imminent and medical treatment will not help her recover, the patient does not want life prolonging measures. If her condition makes her unaware of self or surroundings and she cannot interact with others, the patient does not want life prolonging measures. Son is encouraged bring her advance directive to the next visit.

## 2019-05-08 NOTE — PROGRESS NOTES
Abiola Latham is a 80 y.o. female presents in office for home health assessment f/u. Health Maintenance Due Topic Date Due  
 EYE EXAM RETINAL OR DILATED  01[birth year]01/1935  Shingrix Vaccine Age 50> (1 of 2) 01/01/1975  Pneumococcal 65+ years (1 of 2 - PCV13) 01/01/1990  
 FOOT EXAM Q1  03/14/2018  MEDICARE YEARLY EXAM  03/15/2018  MICROALBUMIN Q1  03/15/2018  LIPID PANEL Q1  06/30/2018 1. Have you been to the ER, urgent care clinic since your last visit? Hospitalized since your last visit? No 
 
2. Have you seen or consulted any other health care providers outside of the [address] [place] [address] since your last visit? Include any pap smears or colon screening.  No

## 2019-05-08 NOTE — PROGRESS NOTES
SUBJECTIVE Chief Complaint Patient presents with  Extremity Weakness Senile debility, Gait difficulty. Needs home health evaluation  GERD  
 Skin Ulcer  
  decubitus HPI:  
 
Son says that she has been eating only breakfast and some food for dinner, which she usually regurgitates back. Son feels the food and Zantac pills are getting stuck and she is not getting any benefit. He does not wants to take her back to GI, because she had \"all kinds of testing\". She has been weak. She is most of the time who wheelchair-bound. These things are gradual and denies any acute symptoms. She developed decubitus ulcer on each side of buttocks few weeks ago. With wound care they have healed. Her body position is being moved frequently when she is seating. Son says she has not been communicating most times. And appears confused most of the time. Her neck and Rt shoulder is no longer bothering her. She denies any acute urinary tract symptoms; but she has history of recurrent UTI. Her son denies any other new symptoms. She has H/O DM; but her HgbA1c has been good for the last few years WO Rx. Past Medical History:  
Diagnosis Date  Back pain  Comminuted left humeral fracture 07/2017  DJD (degenerative joint disease)   
 mostly of knees  DM (diabetes mellitus) ([address]  Fatigue  GERD (gastroesophageal reflux disease) w/chronic pulmonary fibrosis  Herpes zoster  Lumbar spondylosis LBP  Motion sickness   
 chronic  PVCs (premature ventricular contractions)   
 recurrent  Varicose veins  Venous insufficiency Lt LE History reviewed. No pertinent surgical history. Current Outpatient Medications Medication Sig  
 raNITIdine (ZANTAC) 15 mg/mL syrup Take 10 mL by mouth two (2) times a day.  multivitamin (ONE A DAY) tablet Take 1 Tab by mouth daily.  DOCOSAHEXANOIC ACID/EPA (FISH OIL PO) Take 1 Cap by mouth daily.  ACETAMINOPHEN (TYLENOL PO) Take 325 mg by mouth daily as needed.  ginkgo biloba 120 mg tab Take 1 Tab by mouth daily.  vitamin e (E GEMS) 100 unit capsule Take 100 Units by mouth daily.  Ca-D3-mag-zinc--kristina-boron (CALTRATE 600+D PLUS MINERALS) 600 mg calcium- 800 unit-40 mg chew Take 1 Tab by mouth two (2) times a day. (Patient taking differently: Take 1 Tab by mouth daily.)  polyethylene glycol (MIRALAX) 17 gram/dose powder Take 17 g by mouth daily as needed.  GLUCOSAMINE/CHONDROITN/NA/C/SE (JOINT FORMULA PO) Take 1 Cap by mouth daily. No current facility-administered medications for this visit. Allergies: Iodine ROS:  
Constitutional: No fever, chills, night sweats, malaise. Cardiovascular: No angina, palpitations, PND, orthopnea, lightheadedness, edema, claudication. Respiratory: No pleurisy, hemoptysis, unusual sputum. Gastrointestinal: no pain, melena, hematochezia. Psychiatric: No agitation, suicidal or homicidal ideation. Musculoskeletal: recovered from head injury. chronic back/neck problem and gait difficulty with HO cerebellar stroke. DJD of joints. Genitourinary: No dysuria, urgency, frequency. Tellez Santamaria Skin: as above. OBJECTIVE Constitutional: General Appearance:  well developed, lean, nontoxic, in no acute distress. She is oriented to person only. Visit Vitals /58 (BP 1 Location: Left arm, BP Patient Position: Sitting) Pulse 73 Temp 96.4 °F (35.8 °C) (Oral) Resp 18 Ht 5' 5\" (1.651 m) Wt 115 lb (52.2 kg) SpO2 98% BMI 19.14 kg/m² Pulmonary: Respiratory effort: normal; no dyspnea, no retractions, no accessory muscle use. Auscultation: normal & symmetrical air exchange; coarse rales, most prominent at Rt lower area; no rhonchi; no wheeze; no rubs. Cardiovascular: Auscultation: RRR; no murmur, rubs; S4 gallop unchanged. Extremities: no edema. GI[de-identified] Normal bowel sounds.   No masses; no tenderness; no rebound/rigidity; no CVA tenderness. No hepatosplenomegaly. Bladder: no fullnesses, tenderness or palpable masses. Psychiatric: Oriented to time, place and person. Musculoskeletal: 
Gait: In wheelchair; able to stand and take steps with assistance. NC/AT, neck supple. Skin: healed small Sacral decubitus ulcer bilaterally with surrounding hemosiderin staining. Diabetic foot exam:  
 
Left Foot: 
 Visual Exam: normal  
 Pulse DP: 2+ (normal) Filament test: normal sensation Vibratory sensation: diminished Right Foot: 
 Visual Exam: normal  
 Pulse DP: 2+ (normal) Filament test: normal sensation Vibratory sensation: diminished Lab Results Component Value Date/Time WBC 8.1 01/28/2019 04:19 AM  
 HGB 11.6 01/28/2019 04:19 AM  
 HCT 36.6 01/28/2019 04:19 AM  
 PLATELET 836 56/56/5299 04:19 AM  
 MCV 87 01/28/2019 04:19 AM  
 
 
Lab Results Component Value Date/Time TSH 3.710 01/28/2019 04:19 AM  
 
Basic Metabolic Panel (BMP) (92/57/6259 3:50 AM) Basic Metabolic Panel (BMP) (15/96/7392 3:50 AM) Component Value Ref Range POTASSIUM 3.9 3.5 - 5.5 mmol/L  
SODIUM 134 133 - 145 mmol/L  
CHLORIDE 99 98 - 110 mmol/L  
GLUCOSE 95 65 - 99 mg/dL CALCIUM 9.1 8.4 - 10.5 mg/dL BUN 11 6 - 22 mg/dL CREATININE 0.7 (L) 0.8 - 1.4 mg/dL CO2 26 20 - 32 mmol/L  
eGFR African American >60.0 >60.0  
eGFR Non African American >60.0 >60.0 ANION GAP 8.7 mmol/L  
 
HEPATIC FUNCTION PANEL (07/18/2017 3:10 AM) HEPATIC FUNCTION PANEL (07/18/2017 3:10 AM) Component Value Ref Range ALBUMIN 3.6 3.5 - 5.0 g/dL TOTAL PROTEIN 6.7 6.2 - 8.1 g/dL GLOBULIN SERUM 3.1 2.0 - 4.0 g/dL A/G RATIO 1.2 1.1 - 2.6 ratio BILIRUBIN TOTAL 0.6 0.2 - 1.2 mg/dL BILIRUBIN DIRECT <0.2 0.0 - 0.3 mg/dL SGOT (AST) 17 10 - 37 U/L ALKALINE PHOSPHATASE 59 40 - 120 U/L  
SGPT (ALT) 11 5 - 40 U/L  
 
2/7/18 CT of abdomen: No CT evidence of acute abdominal abnormality. Very large lower pole right renal cyst. Large amount retained stool in colon, especially in rectosigmoid. Small calcified uterine fibroid. Bibasilar pulmonary interstitial fibrosis. L3-4 central canal stenosis. ASSESSMENT 1. Encounter for Medicare annual wellness exam   
2. Weakness 3. Gait difficulty 4. Advanced age 5. Gastroesophageal reflux disease, esophagitis presence not specified 6. Pressure injury of buttock, stage 1, unspecified laterality 7. Type 2 diabetes with nephropathy (HCC) 8. Senile dementia without behavioral disturbance 9. Recurrent UTI 10. ACP (advance care planning) PLAN Orders Placed This Encounter  URINALYSIS W/ RFLX MICROSCOPIC  MICROALBUMIN, UR, RAND W/ MICROALB/CREAT RATIO [address] [place]  
 HM DIABETES FOOT EXAM  
 raNITIdine (ZANTAC) 15 mg/mL syrup Pharmacologic Management: Medications reviewed with the son. Will change Zantac to liquid form. Son declines treatment or further workup for dementia. Son declines FU with GI. Add nutritional supplement like Ensure or boost 3 times a day. Decubitus care and avoidence discussed. Son agrees to home health evaluation for PT/OT. Discussed DDx, follow-up & work-up. Discussed risk/benefit/side effect. Increase fluid intake. Follow up in as scheduled, prn sooner. PRN to ER. Health risk from non adherence discussed. Son voiced understanding.  
 
Tim Hamilton MD

## 2019-05-08 NOTE — PROGRESS NOTES
This is the Subsequent Medicare Annual Wellness Exam, performed 12 months or more after the Initial AWV or the last Subsequent AWV I have reviewed the patient's medical history in detail and updated the computerized patient record. History Past Medical History:  
Diagnosis Date  Back pain  Comminuted left humeral fracture 07/2017  DJD (degenerative joint disease)   
 mostly of knees  DM (diabetes mellitus) ([address]  Fatigue  GERD (gastroesophageal reflux disease) w/chronic pulmonary fibrosis  Herpes zoster  Lumbar spondylosis LBP  Motion sickness   
 chronic  PVCs (premature ventricular contractions)   
 recurrent  Varicose veins  Venous insufficiency Lt LE History reviewed. No pertinent surgical history. Current Outpatient Medications Medication Sig Dispense Refill  multivitamin (ONE A DAY) tablet Take 1 Tab by mouth daily.  DOCOSAHEXANOIC ACID/EPA (FISH OIL PO) Take 1 Cap by mouth daily.  ACETAMINOPHEN (TYLENOL PO) Take 325 mg by mouth daily as needed.  ginkgo biloba 120 mg tab Take 1 Tab by mouth daily.  vitamin e (E GEMS) 100 unit capsule Take 100 Units by mouth daily.  Ca-D3-mag-zinc--kristina-boron (CALTRATE 600+D PLUS MINERALS) 600 mg calcium- 800 unit-40 mg chew Take 1 Tab by mouth two (2) times a day. (Patient taking differently: Take 1 Tab by mouth daily.) 60 Tab 3  polyethylene glycol (MIRALAX) 17 gram/dose powder Take 17 g by mouth daily as needed. 510 g 0  
 GLUCOSAMINE/CHONDROITN/NA/C/SE (JOINT FORMULA PO) Take 1 Cap by mouth daily. Allergies Allergen Reactions  Shellfish Derived Anaphylaxis  Salonpas-Hot [Capsaicin] Rash  Iodine Not Reported This Time  Oxycodone-Acetaminophen Other (comments) Son states, \"it makes her crazy\" History reviewed. No pertinent family history. Social History Tobacco Use  Smoking status: Never Smoker  Smokeless tobacco: Never Used Substance Use Topics  Alcohol use: No  
  Alcohol/week: 0.0 oz Patient Active Problem List  
Diagnosis Code  DM (diabetes mellitus) ([facility] [place] E11.9  Back pain M54.9  BPV (benign positional vertigo) H81.10  Reactive depression (situational) F32.9  Constipation K59.00  Anorexia R63.0  Advance directive discussed with patient Z70.80  Cerebellar stroke (HCC) I63.9  Balance disorder R26.89  
 Osteoarthritis of lumbar spine M47.816  Chronic bilateral low back pain M54.5, G89.29  
 Weight loss R63.4  Intracranial hemorrhage following injury ([facility] [place] S82.571I  Type 2 diabetes with nephropathy ([facility]) E11.21  Swelling of joint of right hand M25.441  Scleratitis, unspecified laterality H15.009 Depression Risk Factor Screening:  
 
3 most recent PHQ Screens 3/14/2017 Little interest or pleasure in doing things Not at all Feeling down, depressed, irritable, or hopeless Several days Total Score PHQ 2 1 Alcohol Risk Factor Screening: You do not drink alcohol or very rarely. Functional Ability and Level of Safety:  
Hearing Loss Hearing is good. The patient wears hearing aids. Activities of Daily Living The home contains: grab bars Patient needs help with:  phone, transportation, shopping, preparing meals, laundry, housework, managing medications, managing money, eating, dressing, bathing, hygiene, bathroom needs and walking Fall Risk Fall Risk Assessment, last 12 mths 5/8/2019 Able to walk? Yes Fall in past 12 months? No  
Fall with injury? -  
Number of falls in past 12 months - Fall Risk Score -  
 
 
Abuse Screen Patient is not abused Cognitive Screening Evaluation of Cognitive Function: 
Has your family/caregiver stated any concerns about your memory: yes Cognition: Abnormal 
 
Patient Care Team  
Patient Care Team: 
Stephanie Reyna MD as PCP - General (Family Practice) Syed Knowles RN as Ambulatory Care Navigator Assessment/Plan Education and counseling provided: 
Are appropriate based on today's review and evaluation Diagnoses and all orders for this visit: 1. Encounter for Medicare annual wellness exam 
 
2. Weakness 
-     REFERRAL TO HOME HEALTH 3. Gait difficulty 
-     REFERRAL TO HOME HEALTH 4. Advanced age 
-     [address] [employer] 5. Gastroesophageal reflux disease, esophagitis presence not specified 6. Pressure injury of buttock, stage 1, unspecified laterality 7. Type 2 diabetes with nephropathy (HCC) 
-     URINALYSIS W/ RFLX MICROSCOPIC; Future -     MICROALBUMIN, UR, RAND W/ MICROALB/CREAT RATIO; Future 
-      DIABETES FOOT EXAM 
 
8. Senile dementia without behavioral disturbance 9. Recurrent UTI 10. ACP (advance care planning) Other orders 
-     raNITIdine (ZANTAC) 15 mg/mL syrup; Take 10 mL by mouth two (2) times a day. Health Maintenance Due Topic Date Due  
 EYE EXAM RETINAL OR DILATED  01/01/1935  Shingrix Vaccine Age 50> (1 of 2) 01/01/1975  Pneumococcal 65+ years (1 of 2 - PCV13) 01/01/1990  
 FOOT EXAM Q1  03/14/2018  MEDICARE YEARLY EXAM  03/15/2018  MICROALBUMIN Q1  03/15/2018  LIPID PANEL Q1  06/30/2018 Son advised about eye exam. 
He declines Pneumonia Vaccincs (he thinks she already had them) and Shingrix.

## 2019-05-09 ENCOUNTER — HOME HEALTH ADMISSION (OUTPATIENT)
Dept: HOME HEALTH SERVICES | Facility: HOME HEALTH | Age: 84
End: 2019-05-09
Payer: MEDICARE

## 2019-05-09 LAB
APPEARANCE UR: ABNORMAL
BACTERIA #/AREA URNS HPF: ABNORMAL /[HPF]
BILIRUB UR QL STRIP: NEGATIVE
CASTS URNS QL MICRO: ABNORMAL /LPF
COLOR UR: YELLOW
EPI CELLS #/AREA URNS HPF: ABNORMAL /HPF (ref 0–10)
GLUCOSE UR QL: NEGATIVE
HGB UR QL STRIP: NEGATIVE
KETONES UR QL STRIP: NEGATIVE
LEUKOCYTE ESTERASE UR QL STRIP: ABNORMAL
MICRO URNS: ABNORMAL
MUCOUS THREADS URNS QL MICRO: PRESENT
NITRITE UR QL STRIP: POSITIVE
PH UR STRIP: 6.5 [PH] (ref 5–7.5)
PROT UR QL STRIP: NEGATIVE
RBC #/AREA URNS HPF: ABNORMAL /HPF (ref 0–2)
SP GR UR: 1.01 (ref 1–1.03)
UROBILINOGEN UR STRIP-MCNC: 0.2 MG/DL (ref 0.2–1)
WBC #/AREA URNS HPF: ABNORMAL /HPF (ref 0–5)

## 2019-05-13 ENCOUNTER — HOME CARE VISIT (OUTPATIENT)
Dept: SCHEDULING | Facility: HOME HEALTH | Age: 84
End: 2019-05-13
Payer: MEDICARE

## 2019-05-13 VITALS — SYSTOLIC BLOOD PRESSURE: 135 MMHG | OXYGEN SATURATION: 98 % | HEART RATE: 88 BPM | DIASTOLIC BLOOD PRESSURE: 78 MMHG

## 2019-05-13 LAB
ALBUMIN/CREAT UR: 8.5 MG/G CREAT (ref 0–30)
CREAT UR-MCNC: 66.1 MG/DL
MICROALBUMIN UR-MCNC: 5.6 UG/ML
SPECIMEN STATUS REPORT, ROLRST: NORMAL

## 2019-05-13 PROCEDURE — 400013 HH SOC

## 2019-05-13 PROCEDURE — 3331090001 HH PPS REVENUE CREDIT

## 2019-05-13 PROCEDURE — 3331090002 HH PPS REVENUE DEBIT

## 2019-05-13 PROCEDURE — G0151 HHCP-SERV OF PT,EA 15 MIN: HCPCS

## 2019-05-14 PROCEDURE — 3331090002 HH PPS REVENUE DEBIT

## 2019-05-14 PROCEDURE — 3331090001 HH PPS REVENUE CREDIT

## 2019-05-15 ENCOUNTER — HOME CARE VISIT (OUTPATIENT)
Dept: HOME HEALTH SERVICES | Facility: HOME HEALTH | Age: 84
End: 2019-05-15
Payer: MEDICARE

## 2019-05-15 DIAGNOSIS — R26.9 GAIT DIFFICULTY: ICD-10-CM

## 2019-05-15 DIAGNOSIS — R53.1 WEAKNESS: Primary | ICD-10-CM

## 2019-05-15 DIAGNOSIS — R54 ADVANCED AGE: ICD-10-CM

## 2019-05-15 DIAGNOSIS — L89.301 PRESSURE INJURY OF BUTTOCK, STAGE 1, UNSPECIFIED LATERALITY: ICD-10-CM

## 2019-05-15 DIAGNOSIS — F03.90 SENILE DEMENTIA WITHOUT BEHAVIORAL DISTURBANCE (HCC): ICD-10-CM

## 2019-05-15 PROCEDURE — 3331090001 HH PPS REVENUE CREDIT

## 2019-05-15 PROCEDURE — 3331090002 HH PPS REVENUE DEBIT

## 2019-05-15 NOTE — TELEPHONE ENCOUNTER
Anabell baig/ [facility] is requesting an order for   Standard wheel chair w/ elevated leg rest,   Wheel chair coushion &  3 in 1 camode.    Please fax to [phone]

## 2019-05-16 ENCOUNTER — HOME CARE VISIT (OUTPATIENT)
Dept: SCHEDULING | Facility: HOME HEALTH | Age: 84
End: 2019-05-16
Payer: MEDICARE

## 2019-05-16 VITALS
OXYGEN SATURATION: 97 % | SYSTOLIC BLOOD PRESSURE: 101 MMHG | DIASTOLIC BLOOD PRESSURE: 62 MMHG | HEART RATE: 67 BPM | TEMPERATURE: 97.4 F

## 2019-05-16 VITALS
TEMPERATURE: 97.3 F | DIASTOLIC BLOOD PRESSURE: 68 MMHG | SYSTOLIC BLOOD PRESSURE: 112 MMHG | HEART RATE: 72 BPM | OXYGEN SATURATION: 96 %

## 2019-05-16 PROCEDURE — G0157 HHC PT ASSISTANT EA 15: HCPCS

## 2019-05-16 PROCEDURE — G0152 HHCP-SERV OF OT,EA 15 MIN: HCPCS

## 2019-05-16 PROCEDURE — 3331090002 HH PPS REVENUE DEBIT

## 2019-05-16 PROCEDURE — 3331090001 HH PPS REVENUE CREDIT

## 2019-05-17 ENCOUNTER — HOME CARE VISIT (OUTPATIENT)
Dept: SCHEDULING | Facility: HOME HEALTH | Age: 84
End: 2019-05-17
Payer: MEDICARE

## 2019-05-17 VITALS
HEART RATE: 78 BPM | TEMPERATURE: 97.5 F | DIASTOLIC BLOOD PRESSURE: 65 MMHG | SYSTOLIC BLOOD PRESSURE: 121 MMHG | OXYGEN SATURATION: 93 %

## 2019-05-17 PROCEDURE — 3331090001 HH PPS REVENUE CREDIT

## 2019-05-17 PROCEDURE — G0157 HHC PT ASSISTANT EA 15: HCPCS

## 2019-05-17 PROCEDURE — 3331090002 HH PPS REVENUE DEBIT

## 2019-05-18 ENCOUNTER — HOME CARE VISIT (OUTPATIENT)
Dept: SCHEDULING | Facility: HOME HEALTH | Age: 84
End: 2019-05-18
Payer: MEDICARE

## 2019-05-18 PROCEDURE — 3331090002 HH PPS REVENUE DEBIT

## 2019-05-18 PROCEDURE — G0155 HHCP-SVS OF CSW,EA 15 MIN: HCPCS

## 2019-05-18 PROCEDURE — 3331090001 HH PPS REVENUE CREDIT

## 2019-05-19 PROCEDURE — 3331090002 HH PPS REVENUE DEBIT

## 2019-05-19 PROCEDURE — 3331090001 HH PPS REVENUE CREDIT

## 2019-05-20 ENCOUNTER — HOME CARE VISIT (OUTPATIENT)
Dept: SCHEDULING | Facility: HOME HEALTH | Age: 84
End: 2019-05-20
Payer: MEDICARE

## 2019-05-20 VITALS
TEMPERATURE: 97.7 F | OXYGEN SATURATION: 96 % | DIASTOLIC BLOOD PRESSURE: 65 MMHG | SYSTOLIC BLOOD PRESSURE: 119 MMHG | HEART RATE: 73 BPM

## 2019-05-20 PROCEDURE — G0158 HHC OT ASSISTANT EA 15: HCPCS

## 2019-05-20 PROCEDURE — 3331090002 HH PPS REVENUE DEBIT

## 2019-05-20 PROCEDURE — G0157 HHC PT ASSISTANT EA 15: HCPCS

## 2019-05-20 PROCEDURE — 3331090001 HH PPS REVENUE CREDIT

## 2019-05-21 ENCOUNTER — HOME CARE VISIT (OUTPATIENT)
Dept: HOME HEALTH SERVICES | Facility: HOME HEALTH | Age: 84
End: 2019-05-21
Payer: MEDICARE

## 2019-05-21 ENCOUNTER — HOME CARE VISIT (OUTPATIENT)
Dept: SCHEDULING | Facility: HOME HEALTH | Age: 84
End: 2019-05-21
Payer: MEDICARE

## 2019-05-21 VITALS — TEMPERATURE: 98.1 F | DIASTOLIC BLOOD PRESSURE: 80 MMHG | SYSTOLIC BLOOD PRESSURE: 140 MMHG

## 2019-05-21 PROCEDURE — G0157 HHC PT ASSISTANT EA 15: HCPCS

## 2019-05-21 PROCEDURE — 3331090002 HH PPS REVENUE DEBIT

## 2019-05-21 PROCEDURE — 3331090001 HH PPS REVENUE CREDIT

## 2019-05-22 ENCOUNTER — HOME CARE VISIT (OUTPATIENT)
Dept: SCHEDULING | Facility: HOME HEALTH | Age: 84
End: 2019-05-22
Payer: MEDICARE

## 2019-05-22 VITALS — DIASTOLIC BLOOD PRESSURE: 75 MMHG | TEMPERATURE: 97.2 F | SYSTOLIC BLOOD PRESSURE: 122 MMHG

## 2019-05-22 VITALS
SYSTOLIC BLOOD PRESSURE: 120 MMHG | HEART RATE: 64 BPM | OXYGEN SATURATION: 96 % | DIASTOLIC BLOOD PRESSURE: 61 MMHG | TEMPERATURE: 98.4 F

## 2019-05-22 PROCEDURE — 3331090001 HH PPS REVENUE CREDIT

## 2019-05-22 PROCEDURE — G0158 HHC OT ASSISTANT EA 15: HCPCS

## 2019-05-22 PROCEDURE — 3331090002 HH PPS REVENUE DEBIT

## 2019-05-23 ENCOUNTER — HOME CARE VISIT (OUTPATIENT)
Dept: SCHEDULING | Facility: HOME HEALTH | Age: 84
End: 2019-05-23
Payer: MEDICARE

## 2019-05-23 VITALS — TEMPERATURE: 97.3 F | DIASTOLIC BLOOD PRESSURE: 82 MMHG | SYSTOLIC BLOOD PRESSURE: 118 MMHG

## 2019-05-23 PROCEDURE — G0157 HHC PT ASSISTANT EA 15: HCPCS

## 2019-05-23 PROCEDURE — 3331090001 HH PPS REVENUE CREDIT

## 2019-05-23 PROCEDURE — 3331090002 HH PPS REVENUE DEBIT

## 2019-05-24 ENCOUNTER — HOME CARE VISIT (OUTPATIENT)
Dept: HOME HEALTH SERVICES | Facility: HOME HEALTH | Age: 84
End: 2019-05-24
Payer: MEDICARE

## 2019-05-24 PROCEDURE — 3331090002 HH PPS REVENUE DEBIT

## 2019-05-24 PROCEDURE — 3331090001 HH PPS REVENUE CREDIT

## 2019-05-25 PROCEDURE — 3331090002 HH PPS REVENUE DEBIT

## 2019-05-25 PROCEDURE — 3331090001 HH PPS REVENUE CREDIT

## 2019-05-26 PROCEDURE — 3331090001 HH PPS REVENUE CREDIT

## 2019-05-26 PROCEDURE — 3331090002 HH PPS REVENUE DEBIT

## 2019-05-27 ENCOUNTER — HOME CARE VISIT (OUTPATIENT)
Dept: SCHEDULING | Facility: HOME HEALTH | Age: 84
End: 2019-05-27
Payer: MEDICARE

## 2019-05-27 PROCEDURE — 3331090002 HH PPS REVENUE DEBIT

## 2019-05-27 PROCEDURE — 3331090001 HH PPS REVENUE CREDIT

## 2019-05-28 ENCOUNTER — HOME CARE VISIT (OUTPATIENT)
Dept: HOME HEALTH SERVICES | Facility: HOME HEALTH | Age: 84
End: 2019-05-28
Payer: MEDICARE

## 2019-05-28 ENCOUNTER — HOME CARE VISIT (OUTPATIENT)
Dept: SCHEDULING | Facility: HOME HEALTH | Age: 84
End: 2019-05-28
Payer: MEDICARE

## 2019-05-28 VITALS
DIASTOLIC BLOOD PRESSURE: 62 MMHG | HEART RATE: 64 BPM | SYSTOLIC BLOOD PRESSURE: 116 MMHG | TEMPERATURE: 97.6 F | OXYGEN SATURATION: 95 %

## 2019-05-28 PROCEDURE — 3331090001 HH PPS REVENUE CREDIT

## 2019-05-28 PROCEDURE — G0158 HHC OT ASSISTANT EA 15: HCPCS

## 2019-05-28 PROCEDURE — 3331090002 HH PPS REVENUE DEBIT

## 2019-05-29 ENCOUNTER — HOME CARE VISIT (OUTPATIENT)
Dept: SCHEDULING | Facility: HOME HEALTH | Age: 84
End: 2019-05-29
Payer: MEDICARE

## 2019-05-29 ENCOUNTER — HOME CARE VISIT (OUTPATIENT)
Dept: HOME HEALTH SERVICES | Facility: HOME HEALTH | Age: 84
End: 2019-05-29
Payer: MEDICARE

## 2019-05-29 VITALS — SYSTOLIC BLOOD PRESSURE: 114 MMHG | TEMPERATURE: 96.7 F | DIASTOLIC BLOOD PRESSURE: 54 MMHG

## 2019-05-29 PROCEDURE — 3331090001 HH PPS REVENUE CREDIT

## 2019-05-29 PROCEDURE — G0153 HHCP-SVS OF S/L PATH,EA 15MN: HCPCS

## 2019-05-29 PROCEDURE — 3331090002 HH PPS REVENUE DEBIT

## 2019-05-29 PROCEDURE — G0158 HHC OT ASSISTANT EA 15: HCPCS

## 2019-05-29 PROCEDURE — G0157 HHC PT ASSISTANT EA 15: HCPCS

## 2019-05-30 ENCOUNTER — HOME CARE VISIT (OUTPATIENT)
Dept: SCHEDULING | Facility: HOME HEALTH | Age: 84
End: 2019-05-30
Payer: MEDICARE

## 2019-05-30 VITALS
SYSTOLIC BLOOD PRESSURE: 116 MMHG | HEART RATE: 61 BPM | DIASTOLIC BLOOD PRESSURE: 59 MMHG | OXYGEN SATURATION: 94 % | TEMPERATURE: 97.7 F

## 2019-05-30 VITALS — TEMPERATURE: 96.7 F | DIASTOLIC BLOOD PRESSURE: 54 MMHG | SYSTOLIC BLOOD PRESSURE: 114 MMHG

## 2019-05-30 PROCEDURE — G0157 HHC PT ASSISTANT EA 15: HCPCS

## 2019-05-30 PROCEDURE — 3331090001 HH PPS REVENUE CREDIT

## 2019-05-30 PROCEDURE — 3331090002 HH PPS REVENUE DEBIT

## 2019-05-31 ENCOUNTER — HOME CARE VISIT (OUTPATIENT)
Dept: HOME HEALTH SERVICES | Facility: HOME HEALTH | Age: 84
End: 2019-05-31
Payer: MEDICARE

## 2019-05-31 VITALS
OXYGEN SATURATION: 92 % | SYSTOLIC BLOOD PRESSURE: 122 MMHG | DIASTOLIC BLOOD PRESSURE: 78 MMHG | TEMPERATURE: 98.6 F | HEART RATE: 71 BPM

## 2019-05-31 PROCEDURE — 3331090001 HH PPS REVENUE CREDIT

## 2019-05-31 PROCEDURE — 3331090002 HH PPS REVENUE DEBIT

## 2019-07-25 NOTE — TELEPHONE ENCOUNTER
Patient's son came in regarding patient's diaper rash and skin breakdown. He is requesting optifoam Gentle AG.

## 2019-07-26 NOTE — TELEPHONE ENCOUNTER
Patient's son called to state that he will have to obtain the bandages from a supplier in order to be able to have them covered under insurance. He took the rx to [facility] emporium, who provided him with the number to [employer]. Mr. Nimesh Mckenna stated that he set up an order with [employer] and they will fax an order here to be signed and faxed back.

## 2019-08-19 NOTE — TELEPHONE ENCOUNTER
Pt's son called asking to speak with nursing in regards to his mothers health, nursing was at lunch an unavailable when he called. Son is asking for a call back this afternoon.      Please advise

## 2019-08-19 NOTE — TELEPHONE ENCOUNTER
Spoke with patient's son  He states company will send our office a new prescription order for the Optifoam bandages. He states the original prescription had an error on it. He corrected it himself, but they will not accept it with the corrections he made himself. He would like phone call when this is done. He stated the company was going to contact the insurance company then would fax the new order request. At this time, I do not see that we have received any paperwork.

## 2019-09-30 NOTE — PROGRESS NOTES
SUBJECTIVE Chief Complaint Patient presents with  Rash  
  sacral decubitus  Urinary Burning HPI:  
 
She is now eating better for her son. Her weakness continues and she is mostly wheelchair-bound. However, there are no acute issues. She has a history of cerebellar strokes. She developed decubitus ulcer on each side of buttocks several months ago. With wound care they have healed. Her body position is being moved frequently when she is seating. Only problem now that the area itches and she is scratching still it bleeds. Son says she has not been communicating most times. And appears confused most of the time. She has been having some dysuria for last 3 weeks. She has history of recurrent UTI. Her son denies any other new symptoms. She has H/O DM; but her HgbA1c has been good for the last few years WO Rx. Past Medical History:  
Diagnosis Date  Back pain  Comminuted left humeral fracture 07/2017  DJD (degenerative joint disease)   
 mostly of knees  DM (diabetes mellitus) ([address]  Fatigue  GERD (gastroesophageal reflux disease) w/chronic pulmonary fibrosis  Herpes zoster  Lumbar spondylosis LBP  Motion sickness   
 chronic  PVCs (premature ventricular contractions)   
 recurrent  Varicose veins  Venous insufficiency Lt LE History reviewed. No pertinent surgical history. Current Outpatient Medications Medication Sig  Foam Bandage (OPTIFOAM) 4 X 4 \" bndg Apply to sacral area once daily. Please dispense Optifoam Gentle AG.  Wheel Chair denise Please dispense one standard wheelchair with elevated leg rests. D/t debility. Please fax to [phone]  cyanocobalamin (VITAMIN B12) 500 mcg tablet Take 500 mcg by mouth daily.  ubidecar/octacos/vit B12/Herb7 (ENERGY FORMULA PO) Take 1 Tab by mouth daily.   
 docusate sodium (STOOL SOFTENER) 100 mg capsule Take 100 mg by mouth two (2) times daily as needed for Constipation.  raNITIdine (ZANTAC) 15 mg/mL syrup Take 10 mL by mouth two (2) times a day.  multivitamin (ONE A DAY) tablet Take 1 Tab by mouth daily.  DOCOSAHEXANOIC ACID/EPA (FISH OIL PO) Take 1 Cap by mouth daily.  ACETAMINOPHEN (TYLENOL PO) Take 650 mg by mouth daily as needed for Cough, Diarrhea, Fever, Nausea, Other or Pain.  ginkgo biloba 120 mg tab Take 1 Tab by mouth daily.  vitamin e (E GEMS) 100 unit capsule Take 100 Units by mouth daily.  Ca-D3-mag-zinc--kristina-boron (CALTRATE 600+D PLUS MINERALS) 600 mg calcium- 800 unit-40 mg chew Take 1 Tab by mouth two (2) times a day. (Patient taking differently: Take 1 Tab by mouth daily.)  polyethylene glycol (MIRALAX) 17 gram/dose powder Take 17 g by mouth daily as needed. (Patient taking differently: Take 17 g by mouth daily as needed for Other (constipation). )  GLUCOSAMINE/CHONDROITN/NA/C/SE (JOINT FORMULA PO) Take 1 Cap by mouth daily. No current facility-administered medications for this visit. Allergies: Iodine ROS:  
Constitutional: No fever, chills, night sweats, malaise. Cardiovascular: No angina, palpitations, PND, orthopnea, lightheadedness, edema, claudication. Respiratory: No pleurisy, hemoptysis, unusual sputum. Gastrointestinal: no pain, melena, hematochezia. Psychiatric: No agitation, suicidal or homicidal ideation. Musculoskeletal: recovered from head injury. chronic back/neck problem and gait difficulty with HO cerebellar stroke. DJD of joints. Genitourinary: Dysuria as above. Skin: as above. OBJECTIVE Constitutional: General Appearance:  well developed, lean, nontoxic, in no acute distress. She is oriented to person only. Visit Vitals /60 (BP 1 Location: Left arm, BP Patient Position: Sitting) Pulse 66 Temp 96.4 °F (35.8 °C) (Oral) Resp 14 Ht 5' 5\" (1.651 m) Wt 106 lb (48.1 kg) BMI 17.64 kg/m² Pulmonary: Respiratory effort: normal; no dyspnea, no retractions, no accessory muscle use. Auscultation: normal & symmetrical air exchange; coarse rales, most prominent at Rt lower area; no rhonchi; no wheeze; no rubs. Cardiovascular: Auscultation: RRR; no murmur, rubs; S4 gallop unchanged. Extremities: no edema. GI[de-identified] Normal bowel sounds. No masses; no tenderness; no rebound/rigidity; no CVA tenderness. No hepatosplenomegaly. Bladder: no fullnesses, tenderness or palpable masses. Psychiatric: Oriented to time, place and person. Musculoskeletal: 
Gait: In wheelchair; able to stand and take steps with assistance. NC/AT, neck supple. Skin: healed small Sacral decubitus ulcer bilaterally with surrounding hemosiderin staining. There are no active wounds. There are no open ulcers. There is no bleeding spots. Lab Results Component Value Date/Time WBC 8.1 01/28/2019 04:19 AM  
 HGB 11.6 01/28/2019 04:19 AM  
 HCT 36.6 01/28/2019 04:19 AM  
 PLATELET 400 81/84/5683 04:19 AM  
 MCV 87 01/28/2019 04:19 AM  
 
 
Lab Results Component Value Date/Time TSH 3.710 01/28/2019 04:19 AM  
 
Basic Metabolic Panel (BMP) (02/24/5620 3:50 AM) Basic Metabolic Panel (BMP) (52/98/7495 3:50 AM) Component Value Ref Range POTASSIUM 3.9 3.5 - 5.5 mmol/L  
SODIUM 134 133 - 145 mmol/L  
CHLORIDE 99 98 - 110 mmol/L  
GLUCOSE 95 65 - 99 mg/dL CALCIUM 9.1 8.4 - 10.5 mg/dL BUN 11 6 - 22 mg/dL CREATININE 0.7 (L) 0.8 - 1.4 mg/dL CO2 26 20 - 32 mmol/L  
eGFR African American >60.0 >60.0  
eGFR Non African American >60.0 >60.0 ANION GAP 8.7 mmol/L  
 
HEPATIC FUNCTION PANEL (07/18/2017 3:10 AM) HEPATIC FUNCTION PANEL (07/18/2017 3:10 AM) Component Value Ref Range ALBUMIN 3.6 3.5 - 5.0 g/dL TOTAL PROTEIN 6.7 6.2 - 8.1 g/dL GLOBULIN SERUM 3.1 2.0 - 4.0 g/dL A/G RATIO 1.2 1.1 - 2.6 ratio BILIRUBIN TOTAL 0.6 0.2 - 1.2 mg/dL BILIRUBIN DIRECT <0.2 0.0 - 0.3 mg/dL SGOT (AST) 17 10 - 37 U/L ALKALINE PHOSPHATASE 59 40 - 120 U/L  
SGPT (ALT) 11 5 - 40 U/L  
 
2/7/18 CT of abdomen: No CT evidence of acute abdominal abnormality. Very large lower pole right renal cyst. Large amount retained stool in colon, especially in rectosigmoid. Small calcified uterine fibroid. Bibasilar pulmonary interstitial fibrosis. L3-4 central canal stenosis. ASSESSMENT 1. Type 2 diabetes with nephropathy (HCC) 2. Recurrent UTI 3. Weight loss 4. Cerebellar stroke ([address] 5. Gait difficulty 6. Advanced age 7. Senile dementia without behavioral disturbance ([address] 8. Pressure injury of buttock, stage 1, unspecified laterality The pressure injury of the buttocks has healed. PLAN Orders Placed This Encounter  CULTURE, URINE  
 HEMOGLOBIN A1C WITH EAG  
 LIPID PANEL  
 URINALYSIS W/ RFLX MICROSCOPIC  CBC WITH AUTOMATED DIFF  
 METABOLIC PANEL, COMPREHENSIVE  
 TSH 3RD GENERATION  
 VITAMIN B12 & FOLATE Pharmacologic Management: Medications reviewed with the son. Son is advised to use topical Benadryl with zinc to help prevent from itching. Son declines FU with GI. Decubitus care and avoidence discussed. Discussed DDx, follow-up & work-up. Discussed risk/benefit/side effect. Increase fluid intake. Follow up in as scheduled, prn sooner. PRN to ER. Health risk from non adherence discussed. Son voiced understanding. Follow-up and Dispositions · Return in about 3 months (around 12/30/2019).  
  
 
 
Shaun Chang MD

## 2019-09-30 NOTE — PROGRESS NOTES
Rose Narayan is a 80 y.o. female presents in office for rash and urinary burning. Health Maintenance Due Topic Date Due  
 EYE EXAM RETINAL OR DILATED  01/01/1935  LIPID PANEL Q1  06/30/2018  HEMOGLOBIN A1C Q6M  07/28/2019 Reviewed health maintenance with patient son, Zaire Card. during today's office visit. 1. Have you been to the ER, urgent care clinic since your last visit? Hospitalized since your last visit? No 
 
2. Have you seen or consulted any other health care providers outside of the [address] [place] [address] since your last visit? Include any pap smears or colon screening.  No

## 2019-10-01 NOTE — PROGRESS NOTES
Complete blood count, comprehensive metabolic panel, thyroid hormone and hemoglobin A1c were all normal. 
Vitamin B12 and folate were good. Lipid panel showed borderline elevation of LDL; but better than last time. Given patient's age, will not recommend pharmacological therapy at this level of LDL.

## 2019-10-02 NOTE — TELEPHONE ENCOUNTER
----- Message from Cem Redding MD sent at 10/1/2019  3:26 PM EDT -----  Complete blood count, comprehensive metabolic panel, thyroid hormone and hemoglobin A1c were all normal.  Vitamin B12 and folate were good. Lipid panel showed borderline elevation of LDL; but better than last time. Given patient's age, will not recommend pharmacological therapy at this level of LDL.

## 2019-12-11 NOTE — TELEPHONE ENCOUNTER
Pt's son called in regards to a rash that his mother was seen for last month by Dr. Carle Cockayne. The son states he was instructed to try and use a benadryl cream for the rash, son states they have tried this cream but that it is not clearing up. He would like to know what else they could try. Pt was advised that Dr. Carle Cockayne is out of office.

## 2019-12-12 NOTE — TELEPHONE ENCOUNTER
She was seen over 2 months ago. Was advised to use topical benadryl with zinc to prevent itching, and not for a rash. Ideally, should not advise without evaluating the rash. However, if she is unable to come and it is not an ulcer, may try OTC hydrocortisone 1% ointment.

## 2020-01-01 ENCOUNTER — VIRTUAL VISIT (OUTPATIENT)
Dept: FAMILY MEDICINE CLINIC | Facility: CLINIC | Age: 85
End: 2020-01-01

## 2020-01-01 ENCOUNTER — TELEPHONE (OUTPATIENT)
Dept: FAMILY MEDICINE CLINIC | Facility: CLINIC | Age: 85
End: 2020-01-01

## 2020-01-01 ENCOUNTER — HOME CARE VISIT (OUTPATIENT)
Dept: SCHEDULING | Facility: HOME HEALTH | Age: 85
End: 2020-01-01
Payer: MEDICARE

## 2020-01-01 ENCOUNTER — HOME HEALTH ADMISSION (OUTPATIENT)
Dept: HOME HEALTH SERVICES | Facility: HOME HEALTH | Age: 85
End: 2020-01-01

## 2020-01-01 ENCOUNTER — HOME CARE VISIT (OUTPATIENT)
Dept: HOME HEALTH SERVICES | Facility: HOME HEALTH | Age: 85
End: 2020-01-01

## 2020-01-01 ENCOUNTER — HOME CARE VISIT (OUTPATIENT)
Dept: HOME HEALTH SERVICES | Facility: HOME HEALTH | Age: 85
End: 2020-01-01
Payer: MEDICARE

## 2020-01-01 ENCOUNTER — HOME HEALTH ADMISSION (OUTPATIENT)
Dept: HOME HEALTH SERVICES | Facility: HOME HEALTH | Age: 85
End: 2020-01-01
Payer: MEDICARE

## 2020-01-01 ENCOUNTER — OFFICE VISIT (OUTPATIENT)
Dept: FAMILY MEDICINE CLINIC | Facility: CLINIC | Age: 85
End: 2020-01-01

## 2020-01-01 VITALS
SYSTOLIC BLOOD PRESSURE: 110 MMHG | HEART RATE: 80 BPM | RESPIRATION RATE: 18 BRPM | DIASTOLIC BLOOD PRESSURE: 70 MMHG | TEMPERATURE: 97.4 F

## 2020-01-01 VITALS
TEMPERATURE: 98.3 F | RESPIRATION RATE: 18 BRPM | SYSTOLIC BLOOD PRESSURE: 100 MMHG | HEART RATE: 66 BPM | OXYGEN SATURATION: 95 % | DIASTOLIC BLOOD PRESSURE: 70 MMHG

## 2020-01-01 VITALS
OXYGEN SATURATION: 99 % | WEIGHT: 107 LBS | SYSTOLIC BLOOD PRESSURE: 138 MMHG | RESPIRATION RATE: 24 BRPM | DIASTOLIC BLOOD PRESSURE: 64 MMHG | HEART RATE: 87 BPM | TEMPERATURE: 96.6 F | HEIGHT: 65 IN | BODY MASS INDEX: 17.83 KG/M2

## 2020-01-01 VITALS
HEART RATE: 86 BPM | SYSTOLIC BLOOD PRESSURE: 120 MMHG | TEMPERATURE: 97.3 F | RESPIRATION RATE: 18 BRPM | DIASTOLIC BLOOD PRESSURE: 76 MMHG

## 2020-01-01 VITALS
SYSTOLIC BLOOD PRESSURE: 110 MMHG | TEMPERATURE: 98.3 F | RESPIRATION RATE: 18 BRPM | DIASTOLIC BLOOD PRESSURE: 60 MMHG | HEART RATE: 83 BPM

## 2020-01-01 VITALS
DIASTOLIC BLOOD PRESSURE: 60 MMHG | RESPIRATION RATE: 18 BRPM | OXYGEN SATURATION: 96 % | TEMPERATURE: 97.2 F | HEART RATE: 95 BPM | SYSTOLIC BLOOD PRESSURE: 110 MMHG

## 2020-01-01 VITALS
TEMPERATURE: 98.5 F | RESPIRATION RATE: 18 BRPM | OXYGEN SATURATION: 96 % | HEART RATE: 67 BPM | SYSTOLIC BLOOD PRESSURE: 118 MMHG | DIASTOLIC BLOOD PRESSURE: 70 MMHG

## 2020-01-01 DIAGNOSIS — F03.90 SENILE DEMENTIA WITHOUT BEHAVIORAL DISTURBANCE (HCC): ICD-10-CM

## 2020-01-01 DIAGNOSIS — R26.9 GAIT DIFFICULTY: ICD-10-CM

## 2020-01-01 DIAGNOSIS — R62.7 FAILURE TO THRIVE IN ADULT: Primary | ICD-10-CM

## 2020-01-01 DIAGNOSIS — L89.301 PRESSURE INJURY OF BUTTOCK, STAGE 1, UNSPECIFIED LATERALITY: Primary | ICD-10-CM

## 2020-01-01 DIAGNOSIS — R63.0 ANOREXIA: ICD-10-CM

## 2020-01-01 DIAGNOSIS — E11.21 TYPE 2 DIABETES WITH NEPHROPATHY (HCC): ICD-10-CM

## 2020-01-01 DIAGNOSIS — R54 ADVANCED AGE: ICD-10-CM

## 2020-01-01 DIAGNOSIS — K21.9 GASTROESOPHAGEAL REFLUX DISEASE, ESOPHAGITIS PRESENCE NOT SPECIFIED: ICD-10-CM

## 2020-01-01 DIAGNOSIS — L89.302 PRESSURE INJURY OF BUTTOCK, STAGE 2, UNSPECIFIED LATERALITY (HCC): Primary | ICD-10-CM

## 2020-01-01 DIAGNOSIS — B37.0 THRUSH, ORAL: ICD-10-CM

## 2020-01-01 PROCEDURE — G0300 HHS/HOSPICE OF LPN EA 15 MIN: HCPCS

## 2020-01-01 PROCEDURE — A6212 FOAM DRG <=16 SQ IN W/BORDER: HCPCS

## 2020-01-01 PROCEDURE — 3331090002 HH PPS REVENUE DEBIT

## 2020-01-01 PROCEDURE — 3331090001 HH PPS REVENUE CREDIT

## 2020-01-01 PROCEDURE — A6196 ALGINATE DRESSING <=16 SQ IN: HCPCS

## 2020-01-01 PROCEDURE — G0299 HHS/HOSPICE OF RN EA 15 MIN: HCPCS

## 2020-01-01 PROCEDURE — 400013 HH SOC

## 2020-01-01 PROCEDURE — A6260 WOUND CLEANSER ANY TYPE/SIZE: HCPCS

## 2020-01-01 RX ORDER — FOAM BANDAGE 4" X 4"
BANDAGE TOPICAL
Qty: 30 EACH | Refills: 1 | Status: SHIPPED | OUTPATIENT
Start: 2020-01-01 | End: 2020-01-01 | Stop reason: SDUPTHER

## 2020-01-01 RX ORDER — FAMOTIDINE 20 MG/1
20 TABLET, FILM COATED ORAL
Qty: 30 TAB | Refills: 2 | Status: SHIPPED | OUTPATIENT
Start: 2020-01-01

## 2020-01-01 RX ORDER — MIRTAZAPINE 7.5 MG/1
7.5 TABLET, FILM COATED ORAL
Qty: 30 TAB | Refills: 0 | Status: SHIPPED | OUTPATIENT
Start: 2020-01-01 | End: 2020-01-01 | Stop reason: SDUPTHER

## 2020-01-01 RX ORDER — FOAM BANDAGE 4" X 4"
BANDAGE TOPICAL
Qty: 90 EACH | Refills: 0 | Status: SHIPPED | OUTPATIENT
Start: 2020-01-01 | End: 2020-01-01 | Stop reason: SDUPTHER

## 2020-01-01 RX ORDER — FOAM BANDAGE 4" X 4"
BANDAGE TOPICAL
Qty: 90 EACH | Refills: 0 | Status: SHIPPED | OUTPATIENT
Start: 2020-01-01 | End: 2020-01-01

## 2020-01-01 RX ORDER — NYSTATIN 100000 [USP'U]/ML
1 SUSPENSION ORAL 4 TIMES DAILY
Qty: 280 ML | Refills: 0 | Status: SHIPPED | OUTPATIENT
Start: 2020-01-01 | End: 2020-01-01

## 2020-01-01 RX ORDER — MIRTAZAPINE 7.5 MG/1
7.5 TABLET, FILM COATED ORAL
Qty: 30 TAB | Refills: 0 | Status: SHIPPED | OUTPATIENT
Start: 2020-01-01

## 2020-03-11 NOTE — TELEPHONE ENCOUNTER
Prescription printed. We will fax it to any place the family wants or they may  the printed prescription.

## 2020-03-11 NOTE — PROGRESS NOTES
Marimar Mcclellan is a 80 y.o. female (: 1925) presenting to address:    Chief Complaint   Patient presents with    Skin Problem     pressure injury of buttock       Vitals:    20 1402   BP: 138/64   Pulse: 87   Resp: 24   Temp: 96.6 °F (35.9 °C)   TempSrc: Oral   SpO2: 99%   Weight: 107 lb (48.5 kg)   Height: 5' 5\" (1.651 m)       Hearing/Vision:   No exam data present    Learning Assessment:     Learning Assessment 2016   PRIMARY LEARNER Patient   HIGHEST LEVEL OF EDUCATION - PRIMARY LEARNER  4 YEARS OF COLLEGE   BARRIERS PRIMARY LEARNER COGNITIVE   CO-LEARNER CAREGIVER Yes   CO-LEARNER NAME Daughter/Son   CO-LEARNER HIGHEST LEVEL OF EDUCATION SOME COLLEGE   BARRIERS CO-LEARNER NONE   PRIMARY LANGUAGE ENGLISH   PRIMARY LANGUAGE CO-LEARNER ENGLISH    NEED No   LEARNER PREFERENCE PRIMARY DEMONSTRATION     READING   LEARNER PREFERENCE CO-LEARNER DEMONSTRATION   LEARNING SPECIAL TOPICS no   ANSWERED BY patient   RELATIONSHIP SELF     Depression Screening:     3 most recent PHQ Screens 3/14/2017   Little interest or pleasure in doing things Not at all   Feeling down, depressed, irritable, or hopeless Several days   Total Score PHQ 2 1     Fall Risk Assessment:     Fall Risk Assessment, last 12 mths 3/11/2020   Able to walk? No   Fall in past 12 months? -   Fall with injury? -   Number of falls in past 12 months -   Fall Risk Score -     Abuse Screening:     Abuse Screening Questionnaire 2019   Do you ever feel afraid of your partner? -   Are you in a relationship with someone who physically or mentally threatens you? -   Is it safe for you to go home? Y     Coordination of Care Questionaire:   1. Have you been to the ER, urgent care clinic since your last visit? Hospitalized since your last visit? NO    2. Have you seen or consulted any other health care providers outside of the [address] [place] [address] since your last visit? Include any pap smears or colon screening. NO    Advanced Directive:   1. Do you have an Advanced Directive? NO    2. Would you like information on Advanced Directives?  NO

## 2020-03-11 NOTE — PROGRESS NOTES
SUBJECTIVE    Chief Complaint   Patient presents with    Skin Problem     pressure injury of buttock     HPI:     She developed decubitus ulcer on each side of buttocks several months ago. With wound care they healed. Now the pressure ulcers are coming back. They are mild with some bleeding. She is now eating breakfast and dinner. She refuses to have lunch. Her weakness continues and she is mostly wheelchair-bound. She has been having more saliva in her mouth. She has a history of acid reflux; but she is not taking Zantac which was helping her in the past.  There are no other acute issues. Son says she has not been communicating most times. And appears confused most of the time. She has H/O DM; but her HgbA1c has been good for the last few years WO Rx. Past Medical History:   Diagnosis Date    Back pain     Comminuted left humeral fracture 07/2017    DJD (degenerative joint disease)     mostly of knees    DM (diabetes mellitus) (HCC)     Fatigue     GERD (gastroesophageal reflux disease)     w/chronic pulmonary fibrosis    Herpes zoster     Lumbar spondylosis     LBP    Motion sickness     chronic    PVCs (premature ventricular contractions)     recurrent    Varicose veins     Venous insufficiency     Lt LE     History reviewed. No pertinent surgical history. Current Outpatient Medications   Medication Sig    Foam Bandage (OPTIFOAM) 4 X 4 \" bndg Apply to sacral area once daily. Please dispense Optifoam [employer].  Wheel Chair denise Please dispense one standard wheelchair with elevated leg rests. D/t debility. Please fax to [phone]    cyanocobalamin (VITAMIN B12) 500 mcg tablet Take 500 mcg by mouth daily.  ubidecar/octacos/vit B12/Herb7 (ENERGY FORMULA PO) Take 1 Tab by mouth daily.  docusate sodium (STOOL SOFTENER) 100 mg capsule Take 100 mg by mouth two (2) times daily as needed for Constipation.  multivitamin (ONE A DAY) tablet Take 1 Tab by mouth daily.  DOCOSAHEXANOIC ACID/EPA (FISH OIL PO) Take 1 Cap by mouth daily.  ACETAMINOPHEN (TYLENOL PO) Take 650 mg by mouth daily as needed for Cough, Diarrhea, Fever, Nausea, Other or Pain.  ginkgo biloba 120 mg tab Take 1 Tab by mouth daily.  vitamin e (E GEMS) 100 unit capsule Take 100 Units by mouth daily.  Ca-D3-mag-zinc--kristina-boron (CALTRATE 600+D PLUS MINERALS) 600 mg calcium- 800 unit-40 mg chew Take 1 Tab by mouth two (2) times a day. (Patient taking differently: Take 1 Tab by mouth daily.)    polyethylene glycol (MIRALAX) 17 gram/dose powder Take 17 g by mouth daily as needed. (Patient taking differently: Take 17 g by mouth daily as needed for Other (constipation). )    GLUCOSAMINE/CHONDROITN/NA/C/SE (JOINT FORMULA PO) Take 1 Cap by mouth daily. No current facility-administered medications for this visit. Allergies: Iodine    ROS:   Constitutional: No fever, chills, night sweats, malaise. Cardiovascular: No angina, palpitations, PND, orthopnea, lightheadedness, edema, claudication. Respiratory: No dyspnea, wheeze, pleurisy, hemoptysis, unusual cough or sputum. Gastrointestinal: no pain, melena, hematochezia. Psychiatric: No agitation, suicidal or homicidal ideation. Musculoskeletal: chronic back/neck problem and gait difficulty with HO cerebellar stroke. DJD of joints. Skin: as above. OBJECTIVE  Constitutional: General Appearance:  well developed, lean, nontoxic, in no acute distress. She is oriented to person only. Visit Vitals  /64 (BP 1 Location: Right arm, BP Patient Position: Sitting)   Pulse 87   Temp 96.6 °F (35.9 °C) (Oral)   Resp 24   Ht 5' 5\" (1.651 m)   Wt 107 lb (48.5 kg)   SpO2 99%   BMI 17.81 kg/m²     ENT[de-identified] Mild thrush on tongue. Otherwise the throat is clear. Pulmonary: Respiratory effort: normal; no dyspnea, no retractions, no accessory muscle use.    Auscultation: normal & symmetrical air exchange; coarse rales, most prominent at Rt lower area; no rhonchi; no wheeze; no rubs. Cardiovascular: Auscultation: RRR; no murmur, rubs; S4 gallop unchanged. Extremities: no edema. GI[de-identified] Normal bowel sounds. No masses; no tenderness; no rebound/rigidity; no CVA tenderness. No hepatosplenomegaly. Psychiatric: Oriented to time, place and person. Musculoskeletal:  Gait: In wheelchair; able to stand and take steps with assistance. NC/AT, neck supple. Skin: healed small Sacral decubitus ulcer bilaterally with surrounding hemosiderin staining. There are 3 small shallow ulcers without exudate. No surrounding erythema, heat, induration or tenderness    Lab Results   Component Value Date/Time    Sodium 142 09/30/2019 11:22 AM    Potassium 4.1 09/30/2019 11:22 AM    Chloride 100 09/30/2019 11:22 AM    CO2 26 09/30/2019 11:22 AM    Anion gap 9 03/13/2018 03:38 PM    Glucose 95 09/30/2019 11:22 AM    BUN 19 09/30/2019 11:22 AM    Creatinine 0.77 09/30/2019 11:22 AM    BUN/Creatinine ratio 25 09/30/2019 11:22 AM    GFR est AA 76 09/30/2019 11:22 AM    GFR est non-AA 66 09/30/2019 11:22 AM    Calcium 10.3 09/30/2019 11:22 AM    Bilirubin, total 0.4 09/30/2019 11:22 AM    AST (SGOT) 19 09/30/2019 11:22 AM    Alk. phosphatase 53 09/30/2019 11:22 AM    Protein, total 7.5 09/30/2019 11:22 AM    Albumin 4.2 09/30/2019 11:22 AM    Globulin 4.1 (H) 03/13/2018 03:38 PM    A-G Ratio 1.3 09/30/2019 11:22 AM    ALT (SGPT) 10 09/30/2019 11:22 AM         Lab Results   Component Value Date/Time    WBC 7.7 09/30/2019 11:22 AM    HGB 11.6 09/30/2019 11:22 AM    HCT 36.8 09/30/2019 11:22 AM    PLATELET 995 69/93/3336 11:22 AM    MCV 88 09/30/2019 11:22 AM     Lab Results   Component Value Date/Time    Hemoglobin A1c 5.5 09/30/2019 11:22 AM       Lab Results   Component Value Date/Time    TSH 3.450 09/30/2019 11:22 AM       ASSESSMENT    1. Pressure injury of buttock, stage 1, unspecified laterality    2. Thrush, oral    3. Advanced age    3.  Type 2 diabetes with nephropathy ([facility] [address]    5. Senile dementia without behavioral disturbance ([facility] [address]    6. Gastroesophageal reflux disease, esophagitis presence not specified      PLAN    Orders Placed This Encounter    nystatin (MYCOSTATIN) 100,000 unit/mL suspension    famotidine (PEPCID) 20 mg tablet    Foam Bandage (Optifoam) 4 X 4 \" bndg     Pharmacologic Management: Medications reviewed with the son. Start back on Pepcid 20 mg at bedtime. Nystatin 500,000 units 4 times daily in the mouth. Son declines FU with GI. Decubitus care with OptiForm and prevention discussed. Discussed DDx, follow-up & work-up. Discussed risk/benefit/side effect. Increase fluid intake. Follow up in as scheduled, prn sooner. PRN to ER. Health risk from non adherence discussed. Son voiced understanding. Follow-up and Dispositions    · Return in about 3 months (around 6/11/2020).            Néstor Crockett MD

## 2020-03-11 NOTE — TELEPHONE ENCOUNTER
Pharmacy called and stated that the optifoam bandage Is 61 dollars for 10 pads unless it is ordered through a company like [employer].

## 2020-03-24 NOTE — TELEPHONE ENCOUNTER
Please send me an order that I can sign on connect care. Otherwise an available provider can sign paper order.

## 2020-04-27 NOTE — PROGRESS NOTES
Consent:  
Devante Mart, who was seen by synchronous (real-time) audio-video technology, and/or her healthcare decision maker, is aware that this patient-initiated, Telehealth encounter on 4/27/2020 is a billable service, with coverage as determined by her insurance carrier. She is aware that she may receive a bill and has provided verbal consent to proceed: Yes. SUBJECTIVE Chief Complaint Patient presents with  
 Skin Problem Pressure ulcers in the buttock.  Other Failure to thrive, dementia HPI:  
 
She developed decubitus ulcer on each side of buttocks several months ago. With wound care they healed. The pressure ulcers are coming back since 2 3 months ago. They are mild with some bleeding. She was given prescription for OptiForm about 6 weeks ago. Her son did not get it because of the cost.  Instead he is applying diaper rash cream and Neosporin. The wounds have not gotten any better. Now she has small areas of ulcer in the upper inner thigh. Patient sits on the wheelchair at daytime. The ulcers are mostly in the areas of the pressure of sitting. She is now eating well and gaining some weight. Therefore according to the son her nutrition is good. Her acid reflux issues are also better. Her weakness continues and she is mostly wheelchair-bound. Son says she has not been communicating most times and appears confused most of the time. She is taking Tylenol for arthritis pain. . 
There are no other acute issues. She has H/O DM; but her HgbA1c has been good for the last few years WO Rx. Past Medical History:  
Diagnosis Date  Back pain  Comminuted left humeral fracture 07/2017  DJD (degenerative joint disease)   
 mostly of knees  DM (diabetes mellitus) ([facility] [address]  Fatigue  GERD (gastroesophageal reflux disease) w/chronic pulmonary fibrosis  Herpes zoster  Lumbar spondylosis LBP  Motion sickness   
 chronic  PVCs (premature ventricular contractions)   
 recurrent  Varicose veins  Venous insufficiency Lt LE No past surgical history on file. Current Outpatient Medications Medication Sig  
 famotidine (PEPCID) 20 mg tablet Take 1 Tab by mouth nightly.  Foam Bandage (Optifoam) 4 X 4 \" bndg Apply to sacral area once daily. Please dispense Optifoam [employer].  Wheel Chair denise Please dispense one standard wheelchair with elevated leg rests. D/t debility. Please fax to [phone]  cyanocobalamin (VITAMIN B12) 500 mcg tablet Take 500 mcg by mouth daily.  ubidecar/octacos/vit B12/Herb7 (ENERGY FORMULA PO) Take 1 Tab by mouth daily.  docusate sodium (STOOL SOFTENER) 100 mg capsule Take 100 mg by mouth two (2) times daily as needed for Constipation.  multivitamin (ONE A DAY) tablet Take 1 Tab by mouth daily.  DOCOSAHEXANOIC ACID/EPA (FISH OIL PO) Take 1 Cap by mouth daily.  ACETAMINOPHEN (TYLENOL PO) Take 650 mg by mouth daily as needed for Cough, Diarrhea, Fever, Nausea, Other or Pain.  ginkgo biloba 120 mg tab Take 1 Tab by mouth daily.  vitamin e (E GEMS) 100 unit capsule Take 100 Units by mouth daily.  Ca-D3-mag-zinc--kristina-boron (CALTRATE 600+D PLUS MINERALS) 600 mg calcium- 800 unit-40 mg chew Take 1 Tab by mouth two (2) times a day. (Patient taking differently: Take 1 Tab by mouth daily.)  polyethylene glycol (MIRALAX) 17 gram/dose powder Take 17 g by mouth daily as needed. (Patient taking differently: Take 17 g by mouth daily as needed for Other (constipation). )  GLUCOSAMINE/CHONDROITN/NA/C/SE (JOINT FORMULA PO) Take 1 Cap by mouth daily. No current facility-administered medications for this visit. Not using foam bandage. Allergies Allergen Reactions  Shellfish Derived Anaphylaxis  Salonpas-Hot [Capsaicin] Rash  Iodine Not Reported This Time  Oxycodone-Acetaminophen Other (comments) Son states, \"it makes her crazy\" ROS:  
 Constitutional: No fever, chills, night sweats, malaise. Cardiovascular: No angina, palpitations, PND, orthopnea, lightheadedness, edema, claudication. Respiratory: No dyspnea, wheeze, pleurisy, hemoptysis, unusual cough or sputum. Gastrointestinal: no pain, melena, hematochezia. Psychiatric: No agitation, suicidal or homicidal ideation. Musculoskeletal: chronic back/neck problem and gait difficulty with HO cerebellar stroke. DJD of joints. Skin: Pressure ulcers as above. OBJECTIVE: 
 
Constitutional: General Appearance:  Appears well-developed and well-nourished. Nontoxic, in no acute distress. Mental status:  Alert and awake. Oriented to person. HENT:  Normocephalic, atraumatic. No Facial Asymmetry. Pulmonary: Respiratory effort: normal; no dyspnea. Psychiatric:  Pleasant and noncommunicative. Kristie Itadelfina Skin (as seen on video):  Small sacral and upper postero-medial thigh decubitus ulcers stage 1 to 2 without exudate. No surrounding erythema, heat, induration. Lab Results Component Value Date/Time Sodium 142 09/30/2019 11:22 AM  
 Potassium 4.1 09/30/2019 11:22 AM  
 Chloride 100 09/30/2019 11:22 AM  
 CO2 26 09/30/2019 11:22 AM  
 Anion gap 9 03/13/2018 03:38 PM  
 Glucose 95 09/30/2019 11:22 AM  
 BUN 19 09/30/2019 11:22 AM  
 Creatinine 0.77 09/30/2019 11:22 AM  
 BUN/Creatinine ratio 25 09/30/2019 11:22 AM  
 GFR est AA 76 09/30/2019 11:22 AM  
 GFR est non-AA 66 09/30/2019 11:22 AM  
 Calcium 10.3 09/30/2019 11:22 AM  
 Bilirubin, total 0.4 09/30/2019 11:22 AM  
 AST (SGOT) 19 09/30/2019 11:22 AM  
 Alk. phosphatase 53 09/30/2019 11:22 AM  
 Protein, total 7.5 09/30/2019 11:22 AM  
 Albumin 4.2 09/30/2019 11:22 AM  
 Globulin 4.1 (H) 03/13/2018 03:38 PM  
 A-G Ratio 1.3 09/30/2019 11:22 AM  
 ALT (SGPT) 10 09/30/2019 11:22 AM  
 
 
 
Lab Results Component Value Date/Time  WBC 7.7 09/30/2019 11:22 AM  
 HGB 11.6 09/30/2019 11:22 AM  
 HCT 36.8 09/30/2019 11:22 AM  
 PLATELET 168 05/39/7262 11:22 AM  
 MCV 88 09/30/2019 11:22 AM  
 
Lab Results Component Value Date/Time Hemoglobin A1c 5.5 09/30/2019 11:22 AM  
 
 
Lab Results Component Value Date/Time TSH 3.450 09/30/2019 11:22 AM  
 
 
ASSESSMENT 1. Pressure injury of buttock, stage 1, unspecified laterality 2. Advanced age 3. Senile dementia without behavioral disturbance ([facility] [address] PLAN Orders Placed This Encounter  Foam Bandage (Optifoam) 4 X 4 \" bndg Pharmacologic Management: Medications reviewed with the son. No change. Will resend order for Optifoam to pharmacy the son is requesting. May change to hydrocolloid or hydrogel if cough is still the issue. The family is advised that pressure redistribution is the most important factor in prevention and healing of the pressure ulcer. Discussed various means of pressure reducing including pressure reducing products and also frequent repositioning. The son voiced understanding. Discussed DDx, follow-up & work-up. Discussed risk/benefit/side effect. Discussed nutrition and hydration also. Follow up in as scheduled, prn sooner. PRN to ER. Health risk from non adherence discussed. Son voiced understanding. Follow-up and Dispositions · Return in about 4 weeks (around 5/25/2020). 30 minutes were spent on face to face time with this patient for the aforementioned problems, diagnoses and management plans. All questions answered with family's satisfaction. Allyn Mcneill is a 80 y.o. female who was evaluated by a video visit encounter for concerns as above. A caregiver was present when appropriate. Due to this being a TeleHealth encounter (During ZSRJV-93 public health emergency), evaluation of the following organ systems was limited: Vitals/Constitutional/EENT/Resp/CV/GI//MS/Neuro/Skin/Heme-Lymph-Imm.  
Pursuant to the emergency declaration under the [address] and the National Emergencies Act, [address] [facility] waiver authority and the [employer] and [employer] General Act, this Virtual  Visit was conducted, with patient's (and/or legal guardian's) consent, to reduce the patient's risk of exposure to COVID-19 and provide necessary medical care. Services were provided through a video synchronous discussion virtually to substitute for in-person clinic visit. Patient and provider were located at their individual homes.  
 
 
Lakshmi Bo MD

## 2020-05-04 NOTE — TELEPHONE ENCOUNTER
Prophylactic systemic antibiotic is not recommended for pressure sores. Will try to send for Optifoam again. Son needs to call the pharmacy to see if they received the order. If not, will send to a different pharmacy of their choice. Also will refer to home health for the wound care.

## 2020-05-04 NOTE — TELEPHONE ENCOUNTER
pts son stated the wound on pts buttocks is not getting better and is worried it will get infected and would like to know if there is an antibiotic that can be called in to heal it up faster.

## 2020-05-18 PROBLEM — R26.9 GAIT DIFFICULTY: Status: ACTIVE | Noted: 2020-01-01

## 2020-05-18 PROBLEM — L89.302 PRESSURE INJURY OF BUTTOCK, STAGE 2 (HCC): Status: ACTIVE | Noted: 2020-01-01

## 2020-05-18 PROBLEM — R54 ADVANCED AGE: Status: ACTIVE | Noted: 2020-01-01

## 2020-05-18 NOTE — PROGRESS NOTES
Consent:  
Christina Molina, who was seen by synchronous (real-time) audio-video technology, and/or her healthcare decision maker, is aware that this patient-initiated, Telehealth encounter on 5/18/2020 is a billable service, with coverage as determined by her insurance carrier. She is aware that she may receive a bill and has provided verbal consent to proceed: NA - Consent obtained within past 12 months. Wade Almanza SUBJECTIVE Chief Complaint Patient presents with  
 Skin Problem  
  pressure ulcers. HPI:  
 
She developed decubitus ulcer on each side of buttocks several months ago. With wound care they healed. The pressure ulcers have back since 3-4 months ago. They are gradually worsening over time. She was given prescription for OptiForm which helped her in the past. Her son did not get it this time because of the cost.  Instead he is applying diaper rash cream.  The ulcers are deteriorating. Patient sits on the wheelchair at daytime. The ulcers are mostly in the areas of the pressure of sitting. Patient is elderly with debility and is nonambulatory. She is wheel chair and bed bound. Referral to home health for wound care was given; but family declined due to COVID-19 Pandemic. Now family is willing. According to the son her nutrition is good. Her acid reflux issues are also better. Her weakness continues and she is mostly wheelchair-bound. Son says she has not been communicating most times and appears confused most of the time. She is taking Tylenol for arthritis pain. . 
There are no other acute issues. She has H/O DM; but her HgbA1c has been good for the last few years WO Rx. Past Medical History:  
Diagnosis Date  Back pain  Comminuted left humeral fracture 07/2017  DJD (degenerative joint disease)   
 mostly of knees  DM (diabetes mellitus) ([facility] [address]  Fatigue  GERD (gastroesophageal reflux disease) w/chronic pulmonary fibrosis  Herpes zoster  Lumbar spondylosis LBP  Motion sickness   
 chronic  PVCs (premature ventricular contractions)   
 recurrent  Varicose veins  Venous insufficiency Lt LE No past surgical history on file. Current Outpatient Medications Medication Sig  
 famotidine (PEPCID) 20 mg tablet Take 1 Tab by mouth nightly.  Wheel Chair denise Please dispense one standard wheelchair with elevated leg rests. D/t debility. Please fax to [phone]  cyanocobalamin (VITAMIN B12) 500 mcg tablet Take 500 mcg by mouth daily.  ubidecar/octacos/vit B12/Herb7 (ENERGY FORMULA PO) Take 1 Tab by mouth daily.  docusate sodium (STOOL SOFTENER) 100 mg capsule Take 100 mg by mouth two (2) times daily as needed for Constipation.  multivitamin (ONE A DAY) tablet Take 1 Tab by mouth daily.  DOCOSAHEXANOIC ACID/EPA (FISH OIL PO) Take 1 Cap by mouth daily.  ACETAMINOPHEN (TYLENOL PO) Take 650 mg by mouth daily as needed for Cough, Diarrhea, Fever, Nausea, Other or Pain.  ginkgo biloba 120 mg tab Take 1 Tab by mouth daily.  vitamin e (E GEMS) 100 unit capsule Take 100 Units by mouth daily.  Ca-D3-mag-zinc--kristina-boron (CALTRATE 600+D PLUS MINERALS) 600 mg calcium- 800 unit-40 mg chew Take 1 Tab by mouth two (2) times a day. (Patient taking differently: Take 1 Tab by mouth daily.)  polyethylene glycol (MIRALAX) 17 gram/dose powder Take 17 g by mouth daily as needed. (Patient taking differently: Take 17 g by mouth daily as needed for Other (constipation). )  GLUCOSAMINE/CHONDROITN/NA/C/SE (JOINT FORMULA PO) Take 1 Cap by mouth daily. No current facility-administered medications for this visit. Not using foam bandage. Allergies Allergen Reactions  Shellfish Derived Anaphylaxis  Salonpas-Hot [Capsaicin] Rash  Iodine Not Reported This Time  Oxycodone-Acetaminophen Other (comments) Son states, \"it makes her crazy\" ROS:  
 Constitutional: No fever, chills, night sweats, malaise. Cardiovascular: No angina, palpitations, PND, orthopnea, lightheadedness, edema, claudication. Respiratory: No dyspnea, wheeze, pleurisy, hemoptysis, unusual cough or sputum. Gastrointestinal: no pain, melena, hematochezia. Psychiatric: No agitation, suicidal or homicidal ideation. Musculoskeletal: chronic back/neck problem and gait difficulty with HO cerebellar stroke. DJD of joints. Skin: Pressure ulcers as above. OBJECTIVE: 
 
Constitutional: General Appearance:  Appears in no acute distress. Mental status:  Alert and awake. Oriented to person. HENT:  Normocephalic, atraumatic. No Facial Asymmetry. Pulmonary: Respiratory effort: normal; no dyspnea. Psychiatric:  Pleasant and noncommunicative. Kristie Solano Skin (as seen on video):  Sacral and upper postero-medial thigh decubitus ulcers stage 1 to 2 without exudate are worse than they were 3 weeks ago. Lab Results Component Value Date/Time Sodium 142 09/30/2019 11:22 AM  
 Potassium 4.1 09/30/2019 11:22 AM  
 Chloride 100 09/30/2019 11:22 AM  
 CO2 26 09/30/2019 11:22 AM  
 Anion gap 9 03/13/2018 03:38 PM  
 Glucose 95 09/30/2019 11:22 AM  
 BUN 19 09/30/2019 11:22 AM  
 Creatinine 0.77 09/30/2019 11:22 AM  
 BUN/Creatinine ratio 25 09/30/2019 11:22 AM  
 GFR est AA 76 09/30/2019 11:22 AM  
 GFR est non-AA 66 09/30/2019 11:22 AM  
 Calcium 10.3 09/30/2019 11:22 AM  
 Bilirubin, total 0.4 09/30/2019 11:22 AM  
 AST (SGOT) 19 09/30/2019 11:22 AM  
 Alk. phosphatase 53 09/30/2019 11:22 AM  
 Protein, total 7.5 09/30/2019 11:22 AM  
 Albumin 4.2 09/30/2019 11:22 AM  
 Globulin 4.1 (H) 03/13/2018 03:38 PM  
 A-G Ratio 1.3 09/30/2019 11:22 AM  
 ALT (SGPT) 10 09/30/2019 11:22 AM  
 
 
 
Lab Results Component Value Date/Time  WBC 7.7 09/30/2019 11:22 AM  
 HGB 11.6 09/30/2019 11:22 AM  
 HCT 36.8 09/30/2019 11:22 AM  
 PLATELET 041 17/30/8673 11:22 AM  
 MCV 88 09/30/2019 11:22 AM  
 
 Lab Results Component Value Date/Time Hemoglobin A1c 5.5 09/30/2019 11:22 AM  
 
 
Lab Results Component Value Date/Time TSH 3.450 09/30/2019 11:22 AM  
 
 
ASSESSMENT 1. Pressure injury of buttock, stage 2, unspecified laterality ([facility] [address] 2. Advanced age 3. Gait difficulty 4. Senile dementia without behavioral disturbance ([facility] [address] Patient is home bound. PLAN Orders Placed This Encounter [address] Pharmacologic Management: Medications reviewed with the son. No change. Discussed DDx, follow-up & work-up. Discussed risk/benefit/side effect. Discussed nutrition and hydration also. Follow up in as scheduled, prn sooner. PRN to ER. Health risk from non adherence discussed. Son voiced understanding. Tanner Nath is a 80 y.o. female who was evaluated by a video visit encounter for concerns as above. A caregiver was present when appropriate. Due to this being a TeleHealth encounter (During QXGXR-38 public health emergency), evaluation of the following organ systems was limited: Vitals/Constitutional/EENT/Resp/CV/GI//MS/Neuro/Skin/Heme-Lymph-Imm. Pursuant to the emergency declaration under the [address] waiver authority and the [employer] and [employer] Act, this Virtual  Visit was conducted, with patient's (and/or legal guardian's) consent, to reduce the patient's risk of exposure to COVID-19 and provide necessary medical care. Services were provided through a video synchronous discussion virtually to substitute for in-person clinic visit. Patient and provider were located at their individual homes.  
 
 
Linh Lizarraga MD

## 2020-05-30 NOTE — PROGRESS NOTES
Patient's son called to discuss treatment for her FTT. In the past he said that nutrition was not any problem. He thinks it is now. After a lengthy discussion of risk/ benefit, decided to try Remeron first. Order sent to pharmacy. He voiced understanding.

## 2020-06-02 ENCOUNTER — HOME CARE VISIT (OUTPATIENT)
Dept: HOME HEALTH SERVICES | Facility: HOME HEALTH | Age: 85
End: 2020-06-02
Payer: MEDICARE

## 2021-04-16 NOTE — PROGRESS NOTES
This note will not be viewable in [address] Patient admitted to [facility] on 7/29/17-8/1/17 for UTI . Patient discharged to home with Mikala Perez on 8/1/17. Attempt to contact patient/caregiver  via telephone on 9/15/17 for post hospital follow up. Reached Mr. Charmayne China, patient's son on phone. Patient's son is listed under Pt. Permission to disclose Information. Patient's son verified patient's identity using patient's full name and date of birth. Patient's son stated Arelis  is doing good. \" Patient's son states that PT just left their house. Patient's son denied patient's having confusion, chest pain, shortness of breath, fever, chills, nausea, vomiting, back pain, bladder pain, and blood in urine. No problem with eating and urinating as per Pt. Son. Noted Last OV with Dr. Noemy Obrien was on  8/8/17. Patient's son states that he will call the office to schedule  appointment with Dr. Noemy Obrien. Patient's son states that they are still trying to collect urine for urine culture. Noted no hospitalization  admission post 30 days from discharge date 8/1/17. This episode is closed. Post Hospitalization Encounter Resolved. No further questions,  concern, needs, and/or assistance at this time as per Patient's son. Opportunity to ask questions was provided. Contact information was provided for future reference, assistance, concerns, or further questions. Patient son thanked us for follow up.
Detail Level: Detailed
Quality 130: Documentation Of Current Medications In The Medical Record: Current Medications Documented
Quality 402: Tobacco Use And Help With Quitting Among Adolescents: Patient screened for tobacco and never smoked

## 2021-12-13 NOTE — PROGRESS NOTES
SUBJECTIVE Chief Complaint Patient presents with  Arm Pain  
  right arm pain  Neck Pain  
  neck pain  Extremity Weakness HPI:  
 
Son says that she has been eating only breakfast and some food for dinner. She has been losing weight and getting weaker. She is most of the time who wheelchair-bound. These things are gradual and denies any other acute symptoms. She has been taking Zantac prior to dinner to control her acid reflux symptoms with dinner. Son says she has not been communicating. And appears confused most of the time. She has chronic history of degenerative problem with her spine. Now she has been sleeping in her wheelchair with her neck flexed. She is having pain in the neck mostly on the right side and pain in her right shoulder. She denies any acute urinary tract symptoms; but she has history of recurrent UTI. Her son denies any other acute symptoms. She has H/O DM; but her HgbA1c has been good for the last few years WO Rx. Past Medical History:  
Diagnosis Date  Back pain  Comminuted left humeral fracture 07/2017  DJD (degenerative joint disease)   
 mostly of knees  DM (diabetes mellitus) ([facility] [address]  Fatigue  GERD (gastroesophageal reflux disease) w/chronic pulmonary fibrosis  Herpes zoster  Lumbar spondylosis LBP  Motion sickness   
 chronic  PVCs (premature ventricular contractions)   
 recurrent  Varicose veins  Venous insufficiency Lt LE History reviewed. No pertinent surgical history. Current Outpatient Medications Medication Sig  
 raNITIdine (ZANTAC) 150 mg tablet Take 1 Tab by mouth two (2) times a day.  multivitamin (ONE A DAY) tablet Take 1 Tab by mouth daily.  DOCOSAHEXANOIC ACID/EPA (FISH OIL PO) Take 1 Cap by mouth daily.  ACETAMINOPHEN (TYLENOL PO) Take 325 mg by mouth daily as needed.  ginkgo biloba 120 mg tab Take 1 Tab by mouth daily.  vitamin e (E GEMS) 100 unit capsule Take 100 Units by mouth daily.  Ca-D3-mag-zinc--kristina-boron (CALTRATE 600+D PLUS MINERALS) 600 mg calcium- 800 unit-40 mg chew Take 1 Tab by mouth two (2) times a day. (Patient taking differently: Take 1 Tab by mouth daily.)  polyethylene glycol (MIRALAX) 17 gram/dose powder Take 17 g by mouth daily as needed.  GLUCOSAMINE/CHONDROITN/NA/C/SE (JOINT FORMULA PO) Take 1 Cap by mouth daily. No current facility-administered medications for this visit. Allergies: Iodine ROS:  
Constitutional: No fever, chills, night sweats, malaise. Cardiovascular: No angina, palpitations, PND, orthopnea, lightheadedness, edema, claudication. Respiratory: No pleurisy, hemoptysis, unusual sputum. Gastrointestinal: no pain, melena, hematochezia. Psychiatric: No agitation, suicidal or homicidal ideation. Musculoskeletal: recovered from head injury. chronic back/neck problem and gait difficulty with HO cerebellar stroke. DJD of joints. Genitourinary: No dysuria, urgency, frequency. Nathaniel Smith OBJECTIVE Constitutional: General Appearance:  well developed, lean, nontoxic, in no acute distress. She is oriented to person only. Visit Vitals /67 (BP 1 Location: Right arm, BP Patient Position: Sitting) Pulse (!) 56 Temp 97.3 °F (36.3 °C) (Oral) Resp 14 Ht 5' 5\" (1.651 m) Wt 109 lb (49.4 kg) SpO2 92% BMI 18.14 kg/m² Pulmonary: Respiratory effort: normal; no dyspnea, no retractions, no accessory muscle use. Auscultation: normal & symmetrical air exchange; coarse rales, most prominent at Rt lower area; no rhonchi; no wheeze; no rubs. Cardiovascular: Auscultation: RRR; no murmur, rubs; S4 gallop unchanged. Extremities: no edema. GI[de-identified] Normal bowel sounds. No masses; no tenderness; no rebound/rigidity; no CVA tenderness. No hepatosplenomegaly. Bladder: no fullnesses, tenderness or palpable masses. Psychiatric: Oriented to time, place and person. Musculoskeletal: 
Gait: In wheelchair; able to stand and take steps with assistance. NC/AT, neck supple. Neck: No heat, effusion, redness or swelling with mild TTP of Paraspinal muscles on the right. Decreased ROM with pain. No instability. Rt shoulder: No heat, effusion, redness or swelling without TTP. Decreased ROM. No instability. Lab Results Component Value Date/Time WBC 7.9 03/13/2018 03:38 PM  
 HGB 11.7 (L) 03/13/2018 03:38 PM  
 HCT 36.9 03/13/2018 03:38 PM  
 PLATELET 195 00/58/1702 03:38 PM  
 MCV 87.6 03/13/2018 03:38 PM  
 
 
Lab Results Component Value Date/Time TSH 4.30 (H) 03/13/2018 03:38 PM  
 
Basic Metabolic Panel (BMP) (82/76/5208 3:50 AM) Basic Metabolic Panel (BMP) (59/14/3625 3:50 AM) Component Value Ref Range POTASSIUM 3.9 3.5 - 5.5 mmol/L  
SODIUM 134 133 - 145 mmol/L  
CHLORIDE 99 98 - 110 mmol/L  
GLUCOSE 95 65 - 99 mg/dL CALCIUM 9.1 8.4 - 10.5 mg/dL BUN 11 6 - 22 mg/dL CREATININE 0.7 (L) 0.8 - 1.4 mg/dL CO2 26 20 - 32 mmol/L  
eGFR African American >60.0 >60.0  
eGFR Non African American >60.0 >60.0 ANION GAP 8.7 mmol/L  
 
HEPATIC FUNCTION PANEL (07/18/2017 3:10 AM) HEPATIC FUNCTION PANEL (07/18/2017 3:10 AM) Component Value Ref Range ALBUMIN 3.6 3.5 - 5.0 g/dL TOTAL PROTEIN 6.7 6.2 - 8.1 g/dL GLOBULIN SERUM 3.1 2.0 - 4.0 g/dL A/G RATIO 1.2 1.1 - 2.6 ratio BILIRUBIN TOTAL 0.6 0.2 - 1.2 mg/dL BILIRUBIN DIRECT <0.2 0.0 - 0.3 mg/dL SGOT (AST) 17 10 - 37 U/L ALKALINE PHOSPHATASE 59 40 - 120 U/L  
SGPT (ALT) 11 5 - 40 U/L  
 
2/7/18 CT of abdomen: No CT evidence of acute abdominal abnormality. Very large lower pole right renal cyst. Large amount retained stool in colon, especially in rectosigmoid. Small calcified uterine fibroid. Bibasilar pulmonary interstitial fibrosis. L3-4 central canal stenosis. ASSESSMENT 1. Type 2 diabetes with nephropathy (HCC) 2. Cervicalgia 3. Weakness 4. Gait difficulty 5. Recurrent UTI 6. Senile dementia without behavioral disturbance PLAN Orders Placed This Encounter  CULTURE, URINE  CBC WITH AUTOMATED DIFF  
 METABOLIC PANEL, COMPREHENSIVE  
 TSH 3RD GENERATION  
 HEMOGLOBIN A1C WITH EAG  
 URINALYSIS W/ RFLX MICROSCOPIC  
 IRON PROFILE  
 FERRITIN  
 VITAMIN B12 & FOLATE Pharmacologic Management: Medications reviewed with the son. Son declines treatment or further workup for dementia. FU with GI. Add nutritional supplement like Ensure or boost 3 times a day. Son to consider home physical therapy for gait. Discussed DDx, follow-up & work-up. Discussed risk/benefit/side effect. Increase fluid intake. Follow up in as scheduled, prn sooner. PRN to ER. Health risk from non adherence discussed. Son voiced understanding.  
 
Jenny Vieira MD 
 
 
 English

## 2023-04-25 NOTE — TELEPHONE ENCOUNTER
Patient's, Linda Garcia, contacted with results per PCP, verified 2 patient identifiers. He has no further questions or concerns at this time. He states he is still trying to obtain a urine sample from patient to be able to bring back to clinic. Today's BP WNL  Continue irbesartan - HCTZ, (goal <130/80)   Counseled on low sodium diet, exercise, tobacco avoidance, and limiting alcohol intake   Pt. Has home BP cuff, instructed to measure home BP and report abnormal values

## 2023-12-23 NOTE — TELEPHONE ENCOUNTER
Spoke with pt in regards to results. Advise to follow up with cardiologist and orthopedic. Pt's son acknowledges understanding and voices no concerns at this time. no